# Patient Record
Sex: FEMALE | Race: WHITE | NOT HISPANIC OR LATINO | URBAN - METROPOLITAN AREA
[De-identification: names, ages, dates, MRNs, and addresses within clinical notes are randomized per-mention and may not be internally consistent; named-entity substitution may affect disease eponyms.]

---

## 2023-12-01 ENCOUNTER — OFFICE VISIT (OUTPATIENT)
Dept: OCCUPATIONAL THERAPY | Facility: CLINIC | Age: 63
End: 2023-12-01
Payer: COMMERCIAL

## 2023-12-01 ENCOUNTER — OFFICE VISIT (OUTPATIENT)
Dept: OBGYN CLINIC | Facility: OTHER | Age: 63
End: 2023-12-01
Payer: COMMERCIAL

## 2023-12-01 ENCOUNTER — APPOINTMENT (OUTPATIENT)
Dept: RADIOLOGY | Facility: OTHER | Age: 63
End: 2023-12-01
Payer: COMMERCIAL

## 2023-12-01 VITALS — DIASTOLIC BLOOD PRESSURE: 80 MMHG | SYSTOLIC BLOOD PRESSURE: 118 MMHG | HEIGHT: 66 IN | HEART RATE: 72 BPM

## 2023-12-01 DIAGNOSIS — S63.641D SPRAIN OF ULNAR COLLATERAL LIGAMENT OF METACARPOPHALANGEAL (MCP) JOINT OF RIGHT THUMB, SUBSEQUENT ENCOUNTER: Primary | ICD-10-CM

## 2023-12-01 DIAGNOSIS — M79.644 PAIN OF RIGHT THUMB: ICD-10-CM

## 2023-12-01 DIAGNOSIS — S63.641A SPRAIN OF ULNAR COLLATERAL LIGAMENT OF METACARPOPHALANGEAL (MCP) JOINT OF RIGHT THUMB, INITIAL ENCOUNTER: ICD-10-CM

## 2023-12-01 DIAGNOSIS — S69.91XA INJURY OF RIGHT THUMB, INITIAL ENCOUNTER: ICD-10-CM

## 2023-12-01 DIAGNOSIS — S63.641A SPRAIN OF ULNAR COLLATERAL LIGAMENT OF METACARPOPHALANGEAL (MCP) JOINT OF RIGHT THUMB, INITIAL ENCOUNTER: Primary | ICD-10-CM

## 2023-12-01 PROCEDURE — 99203 OFFICE O/P NEW LOW 30 MIN: CPT | Performed by: ORTHOPAEDIC SURGERY

## 2023-12-01 PROCEDURE — L3913 HFO W/O JOINTS CF: HCPCS

## 2023-12-01 PROCEDURE — 73140 X-RAY EXAM OF FINGER(S): CPT

## 2023-12-01 NOTE — PROGRESS NOTES
[address]  [address] [address]      ASSESSMENT & PLAN:    1. Sprain of ulnar collateral ligament of metacarpophalangeal (MCP) joint of right thumb, initial encounter    2. Injury of right thumb, DOI 12/1/2023      Orders Placed This Encounter   Procedures    Brace    XR thumb right first digit-min 2v    MRI thumb right wo contrast     No orders of the defined types were placed in this encounter. Explained the current clinical findings and reviewed radiological findings with the patient. I suspect that she has sustained right thumb MCP joint UCL sprain. Will recommend custom hand splint for MCP immobilization. Suggest MRI of the right thumb for further evaluation. I informed the patient that I will be out of office over the next few weeks. She is recommended to follow-up with hand orthopedic surgery after completion of her right thumb MRI. In the interim, she is recommended to wear her thumb splint at all times. Patient expresses understanding and is agreeable to my assessment and plan. Patient without further questions or concerns this time. Plan and instruction below was discussed with patient:  There are no Patient Instructions on file for this visit. Follow-Up: No follow-ups on file. IMAGING STUDIES: (I personally reviewed images in PACS and report)    XR right thumb 12/1/2023  No acute osseous abnormality    PAST REPORTS:    PAST PROCEDURES:    Subjective      HPI    Chief Complaint   Patient presents with    Right Thumb - Pain     66 y.o. female presents for initial evaluation of traumatic right thumb pain.       Today patient reports:    Hand dominance: right  Injury related: Yes, DOI 11/21/2023, hyper-extended right thumb when hanging picture frame onto the wall   PMHX: non-contributory    Right thumb pain:  Starting/onset: traumatic  Location: thumb  Pain scale: 0/10 at rest, 4/10 with movement, pain from condition does interfere with activities of daily living  Description: sharp  Radiation: none  Night pain: no  Rest pain: no  Aggravation: gripping  Locking sx: denies locking, catching, or dropping items  Neuro sx: denies neck pain. Denies hand numbness, tingling, or weakness  Treatment: ice and OTC NSAIDS  Previous PT: none  Previous CSI: none  Improvement/worsenin% improved from initial onset      Review of Systems:  Review of Systems         Objective     /80 (BP Location: Left arm, Patient Position: Sitting, Cuff Size: Standard)   Pulse 72   Ht 5' 6" (1.676 m)   There is no height or weight on file to calculate BMI. Patient Active Problem List   Diagnosis    Injury of right thumb, initial encounter        Historical Information     Meds/Allergies   No current outpatient medications on file prior to visit. Allergies   Allergen Reactions    Penicillins Hives and Rash     rash        Historical Information   No past medical history on file. No past surgical history on file.   Family History   Problem Relation Age of Onset    Heart failure Father        Social History   Social Determinants of Health     Tobacco Use: Low Risk  (2023)    Patient History     Smoking Tobacco Use: Never     Smokeless Tobacco Use: Never     Passive Exposure: Not on file   Alcohol Use: Not on file   Financial Resource Strain: Not on file   Food Insecurity: Not on file   Transportation Needs: Not on file   Physical Activity: Not on file   Stress: Not on file   Social Connections: Not on file   Intimate Partner Violence: Not on file   Depression: Not on file   Housing Stability: Not on file   Utilities: Not on file      Social History     Substance and Sexual Activity   Alcohol Use Yes    Comment: Social     Social History     Substance and Sexual Activity   Drug Use Never     Social History     Tobacco Use   Smoking Status Never   Smokeless Tobacco Never       Objective     Physical Exam   Physical Exam  Vitals and nursing note reviewed. Constitutional:       Appearance: She is well-developed. HENT:      Head: Normocephalic and atraumatic. Eyes:      Conjunctiva/sclera: Conjunctivae normal.      Pupils: Pupils are equal, round, and reactive to light. Cardiovascular:      Rate and Rhythm: Normal rate and regular rhythm. Pulmonary:      Effort: Pulmonary effort is normal. No respiratory distress. Skin:     General: Skin is warm and dry. Findings: No erythema. Neurological:      Mental Status: She is alert and oriented to person, place, and time. Cranial Nerves: No cranial nerve deficit. Psychiatric:         Behavior: Behavior normal.         Thought Content: Thought content normal.         Judgment: Judgment normal.       Ortho Exam  Right thumb exam:   Mild swelling of the MCP joint. There are some tenderness to palpation over the ulnar aspect of the MCP joint. Discomfort with laxity on valgus stress testing of the MCP joint in full extension. Mild discomfort without significant laxity with valgus stress testing of the MCP joint at 30 degrees flexion.   No discomfort or pain with varus stress testing of the MCP joint.        --------------------------------------------------    Procedures

## 2023-12-01 NOTE — PROGRESS NOTES
Orthosis    Diagnosis:   1. Sprain of ulnar collateral ligament of metacarpophalangeal (MCP) joint of right thumb, subsequent encounter          Indication:  UCL Sprain    Location: Right  thumb  Supplies: Custom Fit Orthotic  Orthosis type: Hand-Based SPICA  Wearing Schedule: Remove for hygiene only  Describe Position: Hand Based Thumb Spica    Precautions: Universal (skin contact/breakdown)    Patient or Caregiver expresses understanding of wearing Schedule and Precautions? Yes  Patient or Caregiver able to don/doff orthotic independently? Yes    Written orders provided to patient?  Yes  Orders Obtained: Written  Orders Obtained from: Dr. Joe Rodriguze

## 2023-12-04 ENCOUNTER — TELEPHONE (OUTPATIENT)
Age: 63
End: 2023-12-04

## 2023-12-04 ENCOUNTER — OFFICE VISIT (OUTPATIENT)
Dept: OCCUPATIONAL THERAPY | Facility: CLINIC | Age: 63
End: 2023-12-04
Payer: COMMERCIAL

## 2023-12-04 DIAGNOSIS — S69.91XA INJURY OF RIGHT THUMB, INITIAL ENCOUNTER: ICD-10-CM

## 2023-12-04 DIAGNOSIS — S63.641A SPRAIN OF ULNAR COLLATERAL LIGAMENT OF METACARPOPHALANGEAL (MCP) JOINT OF RIGHT THUMB, INITIAL ENCOUNTER: ICD-10-CM

## 2023-12-04 DIAGNOSIS — S63.641D SPRAIN OF ULNAR COLLATERAL LIGAMENT OF METACARPOPHALANGEAL (MCP) JOINT OF RIGHT THUMB, SUBSEQUENT ENCOUNTER: Primary | ICD-10-CM

## 2023-12-04 NOTE — TELEPHONE ENCOUNTER
Called and spoke w/pt and relayed Dr Wood's msg to pt.  She has MRI scheduled for 12/12/23 and appt w/Dr Restrepo on 12/29/23.  She is awaiting to hear if MRI has been approved.  Advised to Call her insurance.

## 2023-12-04 NOTE — TELEPHONE ENCOUNTER
Recommend having MRI and following up with hand orthopedics. Patient likely has UCL tear. Already in a brace.

## 2023-12-04 NOTE — PROGRESS NOTES
Completed splint adjustment for comfort on this date. Splint was rubbing on dorsal hand near [facility]. Therapist adjusted splint. Patient reported increased comfort.

## 2023-12-04 NOTE — TELEPHONE ENCOUNTER
TT sent to Dr Wood to infor R thumb edited results available in [facility]. MRI R casandra scheduled.

## 2023-12-04 NOTE — TELEPHONE ENCOUNTER
Caller: Marika from Radiology  Doctor: [place]    Reason for call: SF xray r thumb 12/1  Results are in EPIC    Call back#: [phone]

## 2023-12-20 ENCOUNTER — HOSPITAL ENCOUNTER (OUTPATIENT)
Dept: RADIOLOGY | Age: 63
Discharge: HOME | End: 2023-12-20
Payer: COMMERCIAL

## 2023-12-20 DIAGNOSIS — S69.91XA INJURY OF RIGHT THUMB, INITIAL ENCOUNTER: ICD-10-CM

## 2023-12-20 PROCEDURE — G1004 CDSM NDSC: HCPCS

## 2023-12-20 PROCEDURE — 73218 MRI UPPER EXTREMITY W/O DYE: CPT

## 2023-12-27 ENCOUNTER — OFFICE VISIT (OUTPATIENT)
Dept: OBGYN CLINIC | Facility: CLINIC | Age: 63
End: 2023-12-27
Payer: OTHER MISCELLANEOUS

## 2023-12-27 DIAGNOSIS — Z01.812 PRE-PROCEDURE LAB EXAM: ICD-10-CM

## 2023-12-27 DIAGNOSIS — S69.91XA INJURY OF RIGHT THUMB, INITIAL ENCOUNTER: ICD-10-CM

## 2023-12-27 DIAGNOSIS — S63.641A RUPTURE OF ULNAR COLLATERAL LIGAMENT OF RIGHT THUMB, INITIAL ENCOUNTER: Primary | ICD-10-CM

## 2023-12-27 PROCEDURE — 99245 OFF/OP CONSLTJ NEW/EST HI 55: CPT | Performed by: SURGERY

## 2023-12-27 RX ORDER — NORETHINDRONE ACETATE AND ETHINYL ESTRADIOL .005; 1 MG/1; MG/1
TABLET, FILM COATED ORAL
COMMUNITY

## 2023-12-27 RX ORDER — DIPHENHYDRAMINE HCL 25 MG
25 CAPSULE ORAL DAILY PRN
COMMUNITY

## 2023-12-27 RX ORDER — CHLORHEXIDINE GLUCONATE ORAL RINSE 1.2 MG/ML
15 SOLUTION DENTAL ONCE
Status: CANCELLED | OUTPATIENT
Start: 2024-01-05 | End: 2023-12-27

## 2023-12-27 RX ORDER — VANCOMYCIN HYDROCHLORIDE 1 G/200ML
1000 INJECTION, SOLUTION INTRAVENOUS ONCE
Status: CANCELLED | OUTPATIENT
Start: 2024-01-05 | End: 2023-12-27

## 2023-12-27 NOTE — H&P
Champ Restrepo M.D.  Attending, Orthopaedic Surgery  Hand, Wrist, and Elbow Surgery  [facility]      ORTHOPAEDIC HAND, WRIST, AND ELBOW OFFICE  VISIT       ASSESSMENT/PLAN:      63-year-old female with right thumb UCL tear, DOI 11/21/23    The MRI was reviewed in the office today which demonstrate a completed UCL tear. On exam, the patient does have gapping compared to the contralateral side. Discussed with the patient that I would recommend surgical intervention in the form of right thumb UCL repair versus reconstruction. Risks and benefits were discussed and detailed below. The patient was agreeable to this. Surgical consent was signed in the office today. Discussed the patient will likely start OT 2 weeks postoperatively for 6-8 weeks. I will see the patient the day of surgery.       The patient verbalized understanding of exam findings and treatment plan. We engaged in the shared decision-making process and treatment options were discussed at length with the patient. Surgical and conservative management discussed today along with risks and benefits.    Diagnoses and all orders for this visit:    Rupture of ulnar collateral ligament of right thumb, initial encounter    Injury of right thumb, DOI 12/1/2023  -     Ambulatory Referral to Hand Surgery    Other orders  -     Fyavolv 1-5 MG-MCG TABS  -     diphenhydrAMINE (BENADRYL) 25 mg capsule; Take 25 mg by mouth daily as needed        Follow Up:  Return for After surgery.    To Do Next Visit:  Sutures out    Operative Discussions:  Thumb MP Ulnar collateral ligament tear: Skiers thumb is an acute rupture of the ulnar collateral ligament of the thumb MP joint creating an unstable thumb. These injuries can heal with immobilization (cast or splint) of the thumb if the ligament remains in close approximation to its ruptured site. However, surgery is still an option and allows for early mobilization. If the ligament is retracted away from its  ruptured site, referred to as a Stenner's lesion, the ligament will not heal with immobilization alone and surgery is required. Surgical treatment consists of repairing the ligament to the bone from which it ruptured (typically the proximal phalanx) with suture and a bone anchor or through bone tunnels.The patient opted for surgical treatment. The risks and benefits of the thumb MP ulnar collateral ligament repair were explained to the patient, which include, but are not limited to: Bleeding, infection, recurrence, pain, scar, damage to tendons, damage to nerves, and damage to blood vessels, failure to give desired results and complications related to anesthesia. These risks, along with alternative conservative treatment options, and postoperative protocols were voiced back and understood by the patient. All questions were answered to the patient's satisfaction. The patient agrees to comply with a standard postoperative protocol, and is willing to proceed. Education was provided via written and auditory forms. There were no barriers to learning. Written handouts regarding wound care, incision and scar care, and general preoperative information was provided to the patient. Prior to surgery, the patient may be requested to stop all anti-inflammatory medications. Prophylactic aspirin, Plavix, and Coumadin may be allowed to be continued. Medications including vitamin E., ginkgo, and fish oil are requested to be stopped approximately one week prior to surgery. Hypertensive medications and beta blockers, if taken, should be continued.      ____________________________________________________________________________________________________________________________________________      CHIEF COMPLAINT:  Chief Complaint   Patient presents with    Right Thumb - Pain     Since wearing brace, has improved       SUBJECTIVE:  Manju Elizondo is a 63 y.o. year old RHD female who presents to the office today as a referral from   [place] for evaluation of right thumb pain. The patient states on 11/21/23 she was trying to take down a piece of art while at work and hyperextended her thumb. The patient states she noted immediate pain. She was evaluated by Dr. Wood on 12/1/23 and he ordered an MRI and was referred to OT for a custom brace. The patient notes pain to the ulnar and radial aspect of her thumb MCP joint. She notes increased pain with . She has been using the custom splint which does provide her with some relief. She describes the pain as intermittent and better at rest.    Pain/symptom timing:  Worse during the day when active  Pain/symptom context:  Worse with activites and work  Pain/symptom modifying factors:  Rest makes better, activities make worse  Pain/symptom associated signs/symptoms: none    Prior treatment   NSAIDsNo   Injections No   Bracing/Orthotics Yes    Physical Therapy No     I have personally reviewed all the relevant PMH, PSH, SH, FH, Medications and allergies      PAST MEDICAL HISTORY:  History reviewed. No pertinent past medical history.    PAST SURGICAL HISTORY:  History reviewed. No pertinent surgical history.    FAMILY HISTORY:  Family History   Problem Relation Age of Onset    Heart failure Father        SOCIAL HISTORY:  Social History     Tobacco Use    Smoking status: Never    Smokeless tobacco: Never   Vaping Use    Vaping status: Never Used   Substance Use Topics    Alcohol use: Yes     Comment: Social    Drug use: Never       MEDICATIONS:    Current Outpatient Medications:     diphenhydrAMINE (BENADRYL) 25 mg capsule, Take 25 mg by mouth daily as needed, Disp: , Rfl:     Fyavolv 1-5 MG-MCG TABS, , Disp: , Rfl:     ALLERGIES:  Allergies   Allergen Reactions    Penicillins Hives and Rash     rash           REVIEW OF SYSTEMS:  Review of Systems   Constitutional:  Negative for chills and fever.   HENT:  Negative for drooling and sneezing.    Eyes:  Negative for redness.   Respiratory:  Negative for  "cough and wheezing.    Gastrointestinal:  Negative for nausea and vomiting.   Musculoskeletal:  Positive for arthralgias. Negative for joint swelling and myalgias.   Neurological:  Negative for weakness and numbness.   Psychiatric/Behavioral:  Negative for behavioral problems. The patient is not nervous/anxious.        VITALS:  There were no vitals filed for this visit.    LABS:  HgA1c: No results found for: \"HGBA1C\"  BMP: No results found for: \"GLUCOSE\", \"CALCIUM\", \"NA\", \"K\", \"CO2\", \"CL\", \"BUN\", \"CREATININE\"    _____________________________________________________  PHYSICAL EXAMINATION:  General: well developed and well nourished, alert, oriented times 3, and appears comfortable  Psychiatric: Normal  HEENT: Normocephalic, Atraumatic Trachea Midline, No torticollis  Pulmonary: No audible wheezing or respiratory distress   Abdomen/GI: Non tender, non distended   Cardiovascular: No pitting edema, 2+ radial pulse   Skin: No masses, erythema, lacerations, fluctation, ulcerations  Neurovascular: Sensation Intact to the Median, Ulnar, Radial Nerve, Motor Intact to the Median, Ulnar, Radial Nerve, and Pulses Intact  Musculoskeletal: Normal, except as noted in detailed exam and in HPI.      MUSCULOSKELETAL EXAMINATION:  Right thumb  TTP UCL  Pain with UCL testing  20 degree UCL instability in 30 of  flexion and  full extension   ___________________________________________________  STUDIES REVIEWED:  I have personally reviewed sagittal, coronal and axial series of Right thumb which demonstrate complete UCL tear.            PROCEDURES PERFORMED:  Procedures  No Procedures performed today    _____________________________________________________      Scribe Attestation      I,:  Atiya Ditmars, MA am acting as a scribe while in the presence of the attending physician.:       I,:  Champ Restrepo MD personally performed the services described in this documentation    as scribed in my presence.:                       "

## 2023-12-27 NOTE — PROGRESS NOTES
Champ Restrepo M.D.  Attending, Orthopaedic Surgery  Hand, Wrist, and Elbow Surgery  [facility]      ORTHOPAEDIC HAND, WRIST, AND ELBOW OFFICE  VISIT       ASSESSMENT/PLAN:      63-year-old female with right thumb UCL tear, DOI 11/21/23    The MRI was reviewed in the office today which demonstrate a completed UCL tear. On exam, the patient does have gapping compared to the contralateral side. Discussed with the patient that I would recommend surgical intervention in the form of right thumb UCL repair versus reconstruction. Risks and benefits were discussed and detailed below. The patient was agreeable to this. Surgical consent was signed in the office today. Discussed the patient will likely start OT 2 weeks postoperatively for 6-8 weeks. I will see the patient the day of surgery.       The patient verbalized understanding of exam findings and treatment plan. We engaged in the shared decision-making process and treatment options were discussed at length with the patient. Surgical and conservative management discussed today along with risks and benefits.    Diagnoses and all orders for this visit:    Rupture of ulnar collateral ligament of right thumb, initial encounter    Injury of right thumb, DOI 12/1/2023  -     Ambulatory Referral to Hand Surgery    Other orders  -     Fyavolv 1-5 MG-MCG TABS  -     diphenhydrAMINE (BENADRYL) 25 mg capsule; Take 25 mg by mouth daily as needed        Follow Up:  Return for After surgery.    To Do Next Visit:  Sutures out    Operative Discussions:  Thumb MP Ulnar collateral ligament tear: Skiers thumb is an acute rupture of the ulnar collateral ligament of the thumb MP joint creating an unstable thumb. These injuries can heal with immobilization (cast or splint) of the thumb if the ligament remains in close approximation to its ruptured site. However, surgery is still an option and allows for early mobilization. If the ligament is retracted away from its  ruptured site, referred to as a Stenner's lesion, the ligament will not heal with immobilization alone and surgery is required. Surgical treatment consists of repairing the ligament to the bone from which it ruptured (typically the proximal phalanx) with suture and a bone anchor or through bone tunnels.The patient opted for surgical treatment. The risks and benefits of the thumb MP ulnar collateral ligament repair were explained to the patient, which include, but are not limited to: Bleeding, infection, recurrence, pain, scar, damage to tendons, damage to nerves, and damage to blood vessels, failure to give desired results and complications related to anesthesia. These risks, along with alternative conservative treatment options, and postoperative protocols were voiced back and understood by the patient. All questions were answered to the patient's satisfaction. The patient agrees to comply with a standard postoperative protocol, and is willing to proceed. Education was provided via written and auditory forms. There were no barriers to learning. Written handouts regarding wound care, incision and scar care, and general preoperative information was provided to the patient. Prior to surgery, the patient may be requested to stop all anti-inflammatory medications. Prophylactic aspirin, Plavix, and Coumadin may be allowed to be continued. Medications including vitamin E., ginkgo, and fish oil are requested to be stopped approximately one week prior to surgery. Hypertensive medications and beta blockers, if taken, should be continued.      ____________________________________________________________________________________________________________________________________________      CHIEF COMPLAINT:  Chief Complaint   Patient presents with    Right Thumb - Pain     Since wearing brace, has improved       SUBJECTIVE:  Manju Elizondo is a 63 y.o. year old RHD female who presents to the office today as a referral from   [place] for evaluation of right thumb pain. The patient states on 11/21/23 she was trying to take down a piece of art while at work and hyperextended her thumb. The patient states she noted immediate pain. She was evaluated by Dr. Wood on 12/1/23 and he ordered an MRI and was referred to OT for a custom brace. The patient notes pain to the ulnar and radial aspect of her thumb MCP joint. She notes increased pain with . She has been using the custom splint which does provide her with some relief. She describes the pain as intermittent and better at rest.    Pain/symptom timing:  Worse during the day when active  Pain/symptom context:  Worse with activites and work  Pain/symptom modifying factors:  Rest makes better, activities make worse  Pain/symptom associated signs/symptoms: none    Prior treatment   NSAIDsNo   Injections No   Bracing/Orthotics Yes    Physical Therapy No     I have personally reviewed all the relevant PMH, PSH, SH, FH, Medications and allergies      PAST MEDICAL HISTORY:  History reviewed. No pertinent past medical history.    PAST SURGICAL HISTORY:  History reviewed. No pertinent surgical history.    FAMILY HISTORY:  Family History   Problem Relation Age of Onset    Heart failure Father        SOCIAL HISTORY:  Social History     Tobacco Use    Smoking status: Never    Smokeless tobacco: Never   Vaping Use    Vaping status: Never Used   Substance Use Topics    Alcohol use: Yes     Comment: Social    Drug use: Never       MEDICATIONS:    Current Outpatient Medications:     diphenhydrAMINE (BENADRYL) 25 mg capsule, Take 25 mg by mouth daily as needed, Disp: , Rfl:     Fyavolv 1-5 MG-MCG TABS, , Disp: , Rfl:     ALLERGIES:  Allergies   Allergen Reactions    Penicillins Hives and Rash     rash           REVIEW OF SYSTEMS:  Review of Systems   Constitutional:  Negative for chills and fever.   HENT:  Negative for drooling and sneezing.    Eyes:  Negative for redness.   Respiratory:  Negative for  "cough and wheezing.    Gastrointestinal:  Negative for nausea and vomiting.   Musculoskeletal:  Positive for arthralgias. Negative for joint swelling and myalgias.   Neurological:  Negative for weakness and numbness.   Psychiatric/Behavioral:  Negative for behavioral problems. The patient is not nervous/anxious.        VITALS:  There were no vitals filed for this visit.    LABS:  HgA1c: No results found for: \"HGBA1C\"  BMP: No results found for: \"GLUCOSE\", \"CALCIUM\", \"NA\", \"K\", \"CO2\", \"CL\", \"BUN\", \"CREATININE\"    _____________________________________________________  PHYSICAL EXAMINATION:  General: well developed and well nourished, alert, oriented times 3, and appears comfortable  Psychiatric: Normal  HEENT: Normocephalic, Atraumatic Trachea Midline, No torticollis  Pulmonary: No audible wheezing or respiratory distress   Abdomen/GI: Non tender, non distended   Cardiovascular: No pitting edema, 2+ radial pulse   Skin: No masses, erythema, lacerations, fluctation, ulcerations  Neurovascular: Sensation Intact to the Median, Ulnar, Radial Nerve, Motor Intact to the Median, Ulnar, Radial Nerve, and Pulses Intact  Musculoskeletal: Normal, except as noted in detailed exam and in HPI.      MUSCULOSKELETAL EXAMINATION:  Right thumb  TTP UCL  Pain with UCL testing  20 degree UCL instability in 30 of  flexion and  full extension   ___________________________________________________  STUDIES REVIEWED:  I have personally reviewed sagittal, coronal and axial series of Right thumb which demonstrate complete UCL tear.            PROCEDURES PERFORMED:  Procedures  No Procedures performed today    _____________________________________________________      Scribe Attestation      I,:  Atiya Ditmars, MA am acting as a scribe while in the presence of the attending physician.:       I,:  Champ Restrepo MD personally performed the services described in this documentation    as scribed in my presence.:                     "

## 2023-12-27 NOTE — H&P (VIEW-ONLY)
Champ Restrepo M.D.  Attending, Orthopaedic Surgery  Hand, Wrist, and Elbow Surgery  [facility]      ORTHOPAEDIC HAND, WRIST, AND ELBOW OFFICE  VISIT       ASSESSMENT/PLAN:      63-year-old female with right thumb UCL tear, DOI 11/21/23    The MRI was reviewed in the office today which demonstrate a completed UCL tear. On exam, the patient does have gapping compared to the contralateral side. Discussed with the patient that I would recommend surgical intervention in the form of right thumb UCL repair versus reconstruction. Risks and benefits were discussed and detailed below. The patient was agreeable to this. Surgical consent was signed in the office today. Discussed the patient will likely start OT 2 weeks postoperatively for 6-8 weeks. I will see the patient the day of surgery.       The patient verbalized understanding of exam findings and treatment plan. We engaged in the shared decision-making process and treatment options were discussed at length with the patient. Surgical and conservative management discussed today along with risks and benefits.    Diagnoses and all orders for this visit:    Rupture of ulnar collateral ligament of right thumb, initial encounter    Injury of right thumb, DOI 12/1/2023  -     Ambulatory Referral to Hand Surgery    Other orders  -     Fyavolv 1-5 MG-MCG TABS  -     diphenhydrAMINE (BENADRYL) 25 mg capsule; Take 25 mg by mouth daily as needed        Follow Up:  Return for After surgery.    To Do Next Visit:  Sutures out    Operative Discussions:  Thumb MP Ulnar collateral ligament tear: Skiers thumb is an acute rupture of the ulnar collateral ligament of the thumb MP joint creating an unstable thumb. These injuries can heal with immobilization (cast or splint) of the thumb if the ligament remains in close approximation to its ruptured site. However, surgery is still an option and allows for early mobilization. If the ligament is retracted away from its  ruptured site, referred to as a Stenner's lesion, the ligament will not heal with immobilization alone and surgery is required. Surgical treatment consists of repairing the ligament to the bone from which it ruptured (typically the proximal phalanx) with suture and a bone anchor or through bone tunnels.The patient opted for surgical treatment. The risks and benefits of the thumb MP ulnar collateral ligament repair were explained to the patient, which include, but are not limited to: Bleeding, infection, recurrence, pain, scar, damage to tendons, damage to nerves, and damage to blood vessels, failure to give desired results and complications related to anesthesia. These risks, along with alternative conservative treatment options, and postoperative protocols were voiced back and understood by the patient. All questions were answered to the patient's satisfaction. The patient agrees to comply with a standard postoperative protocol, and is willing to proceed. Education was provided via written and auditory forms. There were no barriers to learning. Written handouts regarding wound care, incision and scar care, and general preoperative information was provided to the patient. Prior to surgery, the patient may be requested to stop all anti-inflammatory medications. Prophylactic aspirin, Plavix, and Coumadin may be allowed to be continued. Medications including vitamin E., ginkgo, and fish oil are requested to be stopped approximately one week prior to surgery. Hypertensive medications and beta blockers, if taken, should be continued.      ____________________________________________________________________________________________________________________________________________      CHIEF COMPLAINT:  Chief Complaint   Patient presents with    Right Thumb - Pain     Since wearing brace, has improved       SUBJECTIVE:  Manju Elizondo is a 63 y.o. year old RHD female who presents to the office today as a referral from   [place] for evaluation of right thumb pain. The patient states on 11/21/23 she was trying to take down a piece of art while at work and hyperextended her thumb. The patient states she noted immediate pain. She was evaluated by Dr. Wood on 12/1/23 and he ordered an MRI and was referred to OT for a custom brace. The patient notes pain to the ulnar and radial aspect of her thumb MCP joint. She notes increased pain with . She has been using the custom splint which does provide her with some relief. She describes the pain as intermittent and better at rest.    Pain/symptom timing:  Worse during the day when active  Pain/symptom context:  Worse with activites and work  Pain/symptom modifying factors:  Rest makes better, activities make worse  Pain/symptom associated signs/symptoms: none    Prior treatment   NSAIDsNo   Injections No   Bracing/Orthotics Yes    Physical Therapy No     I have personally reviewed all the relevant PMH, PSH, SH, FH, Medications and allergies      PAST MEDICAL HISTORY:  History reviewed. No pertinent past medical history.    PAST SURGICAL HISTORY:  History reviewed. No pertinent surgical history.    FAMILY HISTORY:  Family History   Problem Relation Age of Onset    Heart failure Father        SOCIAL HISTORY:  Social History     Tobacco Use    Smoking status: Never    Smokeless tobacco: Never   Vaping Use    Vaping status: Never Used   Substance Use Topics    Alcohol use: Yes     Comment: Social    Drug use: Never       MEDICATIONS:    Current Outpatient Medications:     diphenhydrAMINE (BENADRYL) 25 mg capsule, Take 25 mg by mouth daily as needed, Disp: , Rfl:     Fyavolv 1-5 MG-MCG TABS, , Disp: , Rfl:     ALLERGIES:  Allergies   Allergen Reactions    Penicillins Hives and Rash     rash           REVIEW OF SYSTEMS:  Review of Systems   Constitutional:  Negative for chills and fever.   HENT:  Negative for drooling and sneezing.    Eyes:  Negative for redness.   Respiratory:  Negative for  "cough and wheezing.    Gastrointestinal:  Negative for nausea and vomiting.   Musculoskeletal:  Positive for arthralgias. Negative for joint swelling and myalgias.   Neurological:  Negative for weakness and numbness.   Psychiatric/Behavioral:  Negative for behavioral problems. The patient is not nervous/anxious.        VITALS:  There were no vitals filed for this visit.    LABS:  HgA1c: No results found for: \"HGBA1C\"  BMP: No results found for: \"GLUCOSE\", \"CALCIUM\", \"NA\", \"K\", \"CO2\", \"CL\", \"BUN\", \"CREATININE\"    _____________________________________________________  PHYSICAL EXAMINATION:  General: well developed and well nourished, alert, oriented times 3, and appears comfortable  Psychiatric: Normal  HEENT: Normocephalic, Atraumatic Trachea Midline, No torticollis  Pulmonary: No audible wheezing or respiratory distress   Abdomen/GI: Non tender, non distended   Cardiovascular: No pitting edema, 2+ radial pulse   Skin: No masses, erythema, lacerations, fluctation, ulcerations  Neurovascular: Sensation Intact to the Median, Ulnar, Radial Nerve, Motor Intact to the Median, Ulnar, Radial Nerve, and Pulses Intact  Musculoskeletal: Normal, except as noted in detailed exam and in HPI.      MUSCULOSKELETAL EXAMINATION:  Right thumb  TTP UCL  Pain with UCL testing  20 degree UCL instability in 30 of  flexion and  full extension   ___________________________________________________  STUDIES REVIEWED:  I have personally reviewed sagittal, coronal and axial series of Right thumb which demonstrate complete UCL tear.            PROCEDURES PERFORMED:  Procedures  No Procedures performed today    _____________________________________________________      Scribe Attestation      I,:  Atiya Ditmars, MA am acting as a scribe while in the presence of the attending physician.:       I,:  Champ Restrepo MD personally performed the services described in this documentation    as scribed in my presence.:                       "

## 2023-12-28 ENCOUNTER — APPOINTMENT (OUTPATIENT)
Dept: LAB | Age: 63
End: 2023-12-28
Payer: OTHER MISCELLANEOUS

## 2023-12-28 DIAGNOSIS — S63.641A RUPTURE OF ULNAR COLLATERAL LIGAMENT OF RIGHT THUMB, INITIAL ENCOUNTER: ICD-10-CM

## 2023-12-28 DIAGNOSIS — Z01.812 PRE-PROCEDURE LAB EXAM: ICD-10-CM

## 2023-12-28 DIAGNOSIS — S69.91XA INJURY OF RIGHT THUMB, INITIAL ENCOUNTER: ICD-10-CM

## 2023-12-28 LAB
ANION GAP SERPL CALCULATED.3IONS-SCNC: 8 MMOL/L
BUN SERPL-MCNC: 15 MG/DL (ref 5–25)
CALCIUM SERPL-MCNC: 9.5 MG/DL (ref 8.4–10.2)
CHLORIDE SERPL-SCNC: 102 MMOL/L (ref 96–108)
CO2 SERPL-SCNC: 29 MMOL/L (ref 21–32)
CREAT SERPL-MCNC: 0.78 MG/DL (ref 0.6–1.3)
GFR SERPL CREATININE-BSD FRML MDRD: 82 ML/MIN/1.73SQ M
GLUCOSE SERPL-MCNC: 93 MG/DL (ref 65–140)
POTASSIUM SERPL-SCNC: 4.1 MMOL/L (ref 3.5–5.3)
SODIUM SERPL-SCNC: 139 MMOL/L (ref 135–147)

## 2023-12-28 PROCEDURE — 93005 ELECTROCARDIOGRAM TRACING: CPT

## 2023-12-28 PROCEDURE — 80048 BASIC METABOLIC PNL TOTAL CA: CPT

## 2023-12-28 PROCEDURE — 36415 COLL VENOUS BLD VENIPUNCTURE: CPT

## 2023-12-28 NOTE — PRE-PROCEDURE INSTRUCTIONS
Pre-Surgery Instructions:   Medication Instructions    diphenhydrAMINE (BENADRYL) 25 mg capsule Uses PRN- OK to take day of surgery    Fyavolv 1-5 MG-MCG TABS Take night before surgery    Medication instructions for day surgery reviewed. Please use only a sip of water to take your instructed medications. Avoid all over the counter vitamins, supplements and NSAIDS for one week prior to surgery per anesthesia guidelines. Tylenol is ok to take as needed.     You will receive a call one business day prior to surgery with an arrival time and hospital directions. If your surgery is scheduled on a Monday, the hospital will be calling you on the Friday prior to your surgery. If you have not heard from anyone by 8pm, please call the hospital supervisor through the hospital  at [phone]. (Silvio [phone]).    Do not eat or drink anything after midnight the night before your surgery, including candy, mints, lifesavers, or chewing gum. Do not drink alcohol 24hrs before your surgery. Try not to smoke at least 24hrs before your surgery.       Follow the pre surgery showering instructions as listed in the “My Surgical Experience Booklet” or otherwise provided by your surgeon's office. Do not use a blade to shave the surgical area 1 week before surgery. It is okay to use a clean electric clippers up to 24 hours before surgery. Do not apply any lotions, creams, including makeup, cologne, deodorant, or perfumes after showering on the day of your surgery. Do not use dry shampoo, hair spray, hair gel, or any type of hair products.     No contact lenses, eye make-up, or artificial eyelashes. Remove nail polish, including gel polish, and any artificial, gel, or acrylic nails if possible. Remove all jewelry including rings and body piercing jewelry.     Wear causal clothing that is easy to take on and off. Consider your type of surgery.    Keep any valuables, jewelry, piercings at home. Please bring any specially ordered  equipment (sling, braces) if indicated.    Arrange for a responsible person to drive you to and from the hospital on the day of your surgery. Visitor Guidelines discussed.     Call the surgeon's office with any new illnesses, exposures, or additional questions prior to surgery.    Please reference your “My Surgical Experience Booklet” for additional information to prepare for your upcoming surgery.

## 2023-12-30 LAB
ATRIAL RATE: 66 BPM
P AXIS: 63 DEGREES
PR INTERVAL: 156 MS
QRS AXIS: 69 DEGREES
QRSD INTERVAL: 68 MS
QT INTERVAL: 386 MS
QTC INTERVAL: 404 MS
T WAVE AXIS: 70 DEGREES
VENTRICULAR RATE: 66 BPM

## 2023-12-30 PROCEDURE — 93010 ELECTROCARDIOGRAM REPORT: CPT | Performed by: INTERNAL MEDICINE

## 2024-01-02 ENCOUNTER — ANESTHESIA EVENT (OUTPATIENT)
Age: 64
End: 2024-01-02
Payer: OTHER MISCELLANEOUS

## 2024-01-04 NOTE — ANESTHESIA PREPROCEDURE EVALUATION
Procedure:  RECONSTRUCTION UCL THUMB (Right: Finger)    Relevant Problems   ANESTHESIA (within normal limits)      Other   (+) Injury of right thumb, initial encounter        Physical Exam    Airway    Mallampati score: I  TM Distance: >3 FB  Neck ROM: full     Dental   No notable dental hx     Cardiovascular  Rhythm: regular, Rate: normal, Cardiovascular exam normal    Pulmonary  Pulmonary exam normal Breath sounds clear to auscultation    Other Findings  post-pubertal.      Anesthesia Plan  ASA Score- 1     Anesthesia Type- regional with ASA Monitors.         Additional Monitors:     Airway Plan:            Plan Factors-Exercise tolerance (METS): >4 METS.    Chart reviewed.    Patient summary reviewed.    Patient is not a current smoker.              Induction-     Postoperative Plan-     Informed Consent- Anesthetic plan and risks discussed with patient.  I personally reviewed this patient with the CRNA. Discussed and agreed on the Anesthesia Plan with the CRNA..

## 2024-01-05 ENCOUNTER — HOSPITAL ENCOUNTER (OUTPATIENT)
Age: 64
Discharge: HOME | End: 2024-01-05

## 2024-01-05 ENCOUNTER — HOSPITAL ENCOUNTER (OUTPATIENT)
Age: 64
Setting detail: OUTPATIENT SURGERY
Discharge: HOME | End: 2024-01-05
Attending: SURGERY | Admitting: SURGERY
Payer: OTHER MISCELLANEOUS

## 2024-01-05 ENCOUNTER — ANESTHESIA (OUTPATIENT)
Age: 64
End: 2024-01-05
Payer: OTHER MISCELLANEOUS

## 2024-01-05 VITALS
OXYGEN SATURATION: 98 % | TEMPERATURE: 97.6 F | BODY MASS INDEX: 20.57 KG/M2 | SYSTOLIC BLOOD PRESSURE: 117 MMHG | RESPIRATION RATE: 22 BRPM | HEIGHT: 66 IN | HEART RATE: 67 BPM | WEIGHT: 128 LBS | DIASTOLIC BLOOD PRESSURE: 56 MMHG

## 2024-01-05 DIAGNOSIS — Z47.89 AFTERCARE FOLLOWING SURGERY OF THE MUSCULOSKELETAL SYSTEM: ICD-10-CM

## 2024-01-05 DIAGNOSIS — S69.91XA INJURY OF RIGHT THUMB, INITIAL ENCOUNTER: ICD-10-CM

## 2024-01-05 DIAGNOSIS — S63.641D RUPTURE OF ULNAR COLLATERAL LIGAMENT OF RIGHT THUMB, SUBSEQUENT ENCOUNTER: Primary | ICD-10-CM

## 2024-01-05 PROBLEM — S63.641A RUPTURE OF ULNAR COLLATERAL LIGAMENT OF RIGHT THUMB: Status: ACTIVE | Noted: 2023-12-01

## 2024-01-05 PROCEDURE — 26540 REPAIR HAND JOINT: CPT | Performed by: STUDENT IN AN ORGANIZED HEALTH CARE EDUCATION/TRAINING PROGRAM

## 2024-01-05 PROCEDURE — C1713 ANCHOR/SCREW BN/BN,TIS/BN: HCPCS | Performed by: SURGERY

## 2024-01-05 DEVICE — SUTURE ANCHOR, MICRO CORKSCREW FT
Type: IMPLANTABLE DEVICE | Site: FINGER | Status: FUNCTIONAL
Brand: ARTHREX®

## 2024-01-05 RX ORDER — IPRATROPIUM BROMIDE AND ALBUTEROL SULFATE 2.5; .5 MG/3ML; MG/3ML
3 SOLUTION RESPIRATORY (INHALATION) ONCE AS NEEDED
Status: DISCONTINUED | OUTPATIENT
Start: 2024-01-05 | End: 2024-01-05 | Stop reason: HOSPADM

## 2024-01-05 RX ORDER — LABETALOL HYDROCHLORIDE 5 MG/ML
10 INJECTION, SOLUTION INTRAVENOUS
Status: DISCONTINUED | OUTPATIENT
Start: 2024-01-05 | End: 2024-01-05 | Stop reason: HOSPADM

## 2024-01-05 RX ORDER — ONDANSETRON 2 MG/ML
4 INJECTION INTRAMUSCULAR; INTRAVENOUS ONCE AS NEEDED
Status: DISCONTINUED | OUTPATIENT
Start: 2024-01-05 | End: 2024-01-05 | Stop reason: HOSPADM

## 2024-01-05 RX ORDER — OXYCODONE HYDROCHLORIDE AND ACETAMINOPHEN 5; 325 MG/1; MG/1
1 TABLET ORAL EVERY 4 HOURS PRN
Status: DISCONTINUED | OUTPATIENT
Start: 2024-01-05 | End: 2024-01-05 | Stop reason: HOSPADM

## 2024-01-05 RX ORDER — MIDAZOLAM HYDROCHLORIDE 2 MG/2ML
INJECTION, SOLUTION INTRAMUSCULAR; INTRAVENOUS AS NEEDED
Status: DISCONTINUED | OUTPATIENT
Start: 2024-01-05 | End: 2024-01-05

## 2024-01-05 RX ORDER — HYDROMORPHONE HCL IN WATER/PF 6 MG/30 ML
0.2 PATIENT CONTROLLED ANALGESIA SYRINGE INTRAVENOUS
Status: DISCONTINUED | OUTPATIENT
Start: 2024-01-05 | End: 2024-01-05 | Stop reason: HOSPADM

## 2024-01-05 RX ORDER — METOCLOPRAMIDE HYDROCHLORIDE 5 MG/ML
10 INJECTION INTRAMUSCULAR; INTRAVENOUS ONCE AS NEEDED
Status: DISCONTINUED | OUTPATIENT
Start: 2024-01-05 | End: 2024-01-05 | Stop reason: HOSPADM

## 2024-01-05 RX ORDER — KETOROLAC TROMETHAMINE 30 MG/ML
INJECTION, SOLUTION INTRAMUSCULAR; INTRAVENOUS AS NEEDED
Status: DISCONTINUED | OUTPATIENT
Start: 2024-01-05 | End: 2024-01-05

## 2024-01-05 RX ORDER — HYDRALAZINE HYDROCHLORIDE 20 MG/ML
5 INJECTION INTRAMUSCULAR; INTRAVENOUS
Status: DISCONTINUED | OUTPATIENT
Start: 2024-01-05 | End: 2024-01-05 | Stop reason: HOSPADM

## 2024-01-05 RX ORDER — BUPIVACAINE HYDROCHLORIDE 2.5 MG/ML
INJECTION, SOLUTION EPIDURAL; INFILTRATION; INTRACAUDAL
Status: COMPLETED | OUTPATIENT
Start: 2024-01-05 | End: 2024-01-05

## 2024-01-05 RX ORDER — PROMETHAZINE HYDROCHLORIDE 25 MG/ML
12.5 INJECTION, SOLUTION INTRAMUSCULAR; INTRAVENOUS ONCE AS NEEDED
Status: DISCONTINUED | OUTPATIENT
Start: 2024-01-05 | End: 2024-01-05 | Stop reason: HOSPADM

## 2024-01-05 RX ORDER — OXYCODONE HYDROCHLORIDE AND ACETAMINOPHEN 5; 325 MG/1; MG/1
1 TABLET ORAL EVERY 4 HOURS PRN
Qty: 10 TABLET | Refills: 0 | Status: SHIPPED | OUTPATIENT
Start: 2024-01-05 | End: 2024-01-15

## 2024-01-05 RX ORDER — CHLORHEXIDINE GLUCONATE ORAL RINSE 1.2 MG/ML
15 SOLUTION DENTAL ONCE
Status: COMPLETED | OUTPATIENT
Start: 2024-01-05 | End: 2024-01-05

## 2024-01-05 RX ORDER — EPHEDRINE SULFATE 50 MG/ML
INJECTION INTRAVENOUS AS NEEDED
Status: DISCONTINUED | OUTPATIENT
Start: 2024-01-05 | End: 2024-01-05

## 2024-01-05 RX ORDER — LIDOCAINE HYDROCHLORIDE 10 MG/ML
INJECTION, SOLUTION EPIDURAL; INFILTRATION; INTRACAUDAL; PERINEURAL AS NEEDED
Status: DISCONTINUED | OUTPATIENT
Start: 2024-01-05 | End: 2024-01-05

## 2024-01-05 RX ORDER — PROPOFOL 10 MG/ML
INJECTION, EMULSION INTRAVENOUS AS NEEDED
Status: DISCONTINUED | OUTPATIENT
Start: 2024-01-05 | End: 2024-01-05

## 2024-01-05 RX ORDER — VANCOMYCIN HYDROCHLORIDE 1 G/200ML
1000 INJECTION, SOLUTION INTRAVENOUS ONCE
Status: DISCONTINUED | OUTPATIENT
Start: 2024-01-05 | End: 2024-01-05 | Stop reason: HOSPADM

## 2024-01-05 RX ORDER — CEFAZOLIN SODIUM 1 G/3ML
INJECTION, POWDER, FOR SOLUTION INTRAMUSCULAR; INTRAVENOUS AS NEEDED
Status: DISCONTINUED | OUTPATIENT
Start: 2024-01-05 | End: 2024-01-05

## 2024-01-05 RX ORDER — ONDANSETRON 2 MG/ML
INJECTION INTRAMUSCULAR; INTRAVENOUS AS NEEDED
Status: DISCONTINUED | OUTPATIENT
Start: 2024-01-05 | End: 2024-01-05

## 2024-01-05 RX ORDER — SODIUM CHLORIDE, SODIUM LACTATE, POTASSIUM CHLORIDE, CALCIUM CHLORIDE 600; 310; 30; 20 MG/100ML; MG/100ML; MG/100ML; MG/100ML
125 INJECTION, SOLUTION INTRAVENOUS CONTINUOUS
Status: DISCONTINUED | OUTPATIENT
Start: 2024-01-05 | End: 2024-01-05 | Stop reason: HOSPADM

## 2024-01-05 RX ORDER — SODIUM CHLORIDE, SODIUM LACTATE, POTASSIUM CHLORIDE, CALCIUM CHLORIDE 600; 310; 30; 20 MG/100ML; MG/100ML; MG/100ML; MG/100ML
75 INJECTION, SOLUTION INTRAVENOUS CONTINUOUS
Status: DISCONTINUED | OUTPATIENT
Start: 2024-01-05 | End: 2024-01-05 | Stop reason: HOSPADM

## 2024-01-05 RX ORDER — ALBUTEROL SULFATE 2.5 MG/3ML
2.5 SOLUTION RESPIRATORY (INHALATION) ONCE AS NEEDED
Status: DISCONTINUED | OUTPATIENT
Start: 2024-01-05 | End: 2024-01-05 | Stop reason: SDUPTHER

## 2024-01-05 RX ORDER — HYDROMORPHONE HCL/PF 1 MG/ML
0.5 SYRINGE (ML) INJECTION
Status: DISCONTINUED | OUTPATIENT
Start: 2024-01-05 | End: 2024-01-05 | Stop reason: HOSPADM

## 2024-01-05 RX ORDER — MAGNESIUM HYDROXIDE 1200 MG/15ML
LIQUID ORAL AS NEEDED
Status: DISCONTINUED | OUTPATIENT
Start: 2024-01-05 | End: 2024-01-05 | Stop reason: HOSPADM

## 2024-01-05 RX ORDER — FENTANYL CITRATE 50 UG/ML
25 INJECTION, SOLUTION INTRAMUSCULAR; INTRAVENOUS
Status: DISCONTINUED | OUTPATIENT
Start: 2024-01-05 | End: 2024-01-05 | Stop reason: HOSPADM

## 2024-01-05 RX ADMIN — SODIUM CHLORIDE, SODIUM LACTATE, POTASSIUM CHLORIDE, AND CALCIUM CHLORIDE 75 ML/HR: .6; .31; .03; .02 INJECTION, SOLUTION INTRAVENOUS at 10:31

## 2024-01-05 RX ADMIN — ONDANSETRON 4 MG: 2 INJECTION INTRAMUSCULAR; INTRAVENOUS at 11:19

## 2024-01-05 RX ADMIN — PROPOFOL 150 MG: 10 INJECTION, EMULSION INTRAVENOUS at 11:09

## 2024-01-05 RX ADMIN — LIDOCAINE HYDROCHLORIDE 50 MG: 10 INJECTION, SOLUTION EPIDURAL; INFILTRATION; INTRACAUDAL; PERINEURAL at 11:09

## 2024-01-05 RX ADMIN — BUPIVACAINE HYDROCHLORIDE 25 ML: 2.5 INJECTION, SOLUTION EPIDURAL; INFILTRATION; INTRACAUDAL; PERINEURAL at 10:50

## 2024-01-05 RX ADMIN — EPHEDRINE SULFATE 5 MG: 50 INJECTION INTRAVENOUS at 11:56

## 2024-01-05 RX ADMIN — CEFAZOLIN 1000 MG: 1 INJECTION, POWDER, FOR SOLUTION INTRAMUSCULAR; INTRAVENOUS; PARENTERAL at 11:18

## 2024-01-05 RX ADMIN — MIDAZOLAM 2 MG: 1 INJECTION INTRAMUSCULAR; INTRAVENOUS at 10:50

## 2024-01-05 RX ADMIN — PROPOFOL 50 MCG/KG/MIN: 10 INJECTION, EMULSION INTRAVENOUS at 11:14

## 2024-01-05 RX ADMIN — KETOROLAC TROMETHAMINE 30 MG: 30 INJECTION, SOLUTION INTRAMUSCULAR; INTRAVENOUS at 12:09

## 2024-01-05 RX ADMIN — CHLORHEXIDINE GLUCONATE 0.12% ORAL RINSE 15 ML: 1.2 LIQUID ORAL at 10:31

## 2024-01-05 NOTE — OP NOTE
OPERATIVE REPORT  PATIENT NAME: Manju Elizondo  :  1960  MRN: [id number]  Pt Location: [facility]    SURGERY DATE: 24    Surgeons and Role:     * Champ Restrepo MD - Primary     * Ruth Simpson PA-C - Assisting     * Yousuf Humphrey MD - Assisting    Pre-Op Diagnosis:  Injury of right thumb, initial encounter [S69.91XA]  Rupture of ulnar collateral ligament of right thumb, initial encounter [S63.641A]    Post-Op Diagnosis Codes:     * Injury of right thumb, initial encounter [S69.91XA]     * Rupture of ulnar collateral ligament of right thumb, initial encounter [S63.641A]    Procedure  Right thumb UCL repair  Specimen(s):  No specimens collected during this procedure.    Estimated Blood Loss:   Minimal    Anesthesia Type:   Regional with Sedation    IMPLANTS:  Implant Name Type Inv. Item Serial No.  Lot No. LRB No. Used Action   ANCHOR SUT MICRO CORKSCREW FT 2-0 - [id number]  ANCHOR SUT MICRO CORKSCREW FT 2-0  [id number] Right 1 Implanted   ANCHOR SUT MICRO CORKSCREW FT 2-0 - [id number]  ANCHOR SUT MICRO CORKSCREW FT 2-0  [employer] [id number] Right 1 Implanted       PERIOPERATIVE ANTIBIOTICS:    cefazolin, 2 grams    Tourniquet Time: 40 min  at 250 mmHg          Operative Indications:  The patient has a history of Thumb UCL Tear  right that was recalcitrant to conservative management.  The decision was made to bring the patient to the operating room for Ulnar Collateral Ligament repair of the thumb  right.  Risks of the procedure were explained which include, but are not limited to bleeding; infection; damage to nerves, arteries,veins, tendons; scar; pain; need for reoperation; failure to give desired result; and risks of anaesthesia.  All questions were answered to satisfaction and they were willing to proceed.         Operative Findings:  Complete UCL tear with Stener lesion     Complications:   None    Procedure and Technique:  After the patient, site, and procedure  were identified, the patient was brought into the operating room in a supine position.  Regional and general anaesthesia were provided.  A well padded tourniquet was applied to the extremity, set at 250 mmHg.  The  right upper extremity was then prepped and drapped in a normal, sterile, orthopedic fashion.    A curvilinear incision was made at the dorsal aspect of the metacarpal extending toward the ulnar aspect of the proximal phalanx.  The superficial sensory nerve and superficial neurovascular structures were protected.  The interval between the adductor pollicis and the extensor pollicis longus was entered.  A complete tear of the UCL was then identified with a Stener lesion .  The bony insertion point at the base of the proximal phalanx was then prepared to bleeding bone.  A freer was placed within the metacarpophalangeal joint demonstrating no significant evidence of joint destruction or articular cartilaginous damage .  Two 2.5 mm Arthrex corkscrew anchors were  placed at the insertion site and the four  limbs used to restore the ligament  to its anatomic position. The repair was reinforced with a capsular closure. At this point, stress testing demonstrated excellent stability of the thumb metacarpophalangeal joint.  The thumb was brought through a full range of motion demonstrating good mobility.  No instability was noted.  The adductor aponeurosis was repaired .  The extensor albright was also repaired in a running fashion      At the completion of the procedure, hemostasis was obtained with cautery and direct pressure.  The wounds were copiously irrigated with sterile solution.  The wounds were closed with Monocryl and Steri-strips.  Sterile dressings were applied, including Xeroform, gauze, tweeners, webril, ACE, Thumb Spica Splint, and Sling.  Please note, all sponge, needle, and instrument counts were correct prior to closure.  Loupe magnification was utilized.  The patient tolerated the procedure well.      I was present for the entire procedure., A qualified resident physician was not available., and A physician assistant was required during the procedure for retraction, tissue handling, dissection and suturing.    Patient Disposition:  PACU     SIGNATURE: Champ Restrepo MD  DATE: 01/05/24  TIME: 12:11 PM

## 2024-01-05 NOTE — ANESTHESIA POSTPROCEDURE EVALUATION
Post-Op Assessment Note    CV Status:  Stable  Pain Score: 0    Pain management: adequate       Mental Status:  Alert and awake   Hydration Status:  Euvolemic   PONV Controlled:  Controlled   Airway Patency:  Patent     Post Op Vitals Reviewed: Yes      Staff: CRNA               /59 (01/05/24 1230)    Temp 98.4 °F (36.9 °C) (01/05/24 1230)    Pulse 78 (01/05/24 1230)   Resp 22 (01/05/24 1230)    SpO2 99 % (01/05/24 1230)

## 2024-01-05 NOTE — DISCHARGE INSTR - AVS FIRST PAGE
Post Operative Instructions    You have had surgery on your arm today, please read and follow the information below:  Elevate your hand above your elbow during the next 24-48 hours to help with swelling.  Place your hand and arm over your head with motion at your shoulder three times a day.  Do not apply any cream/ointment/oil to your incisions including antibiotics.  Do not soak your hands in standing water (dishwater, tubs, Jacuzzi's, pools, etc.) until given permission (typically 2-3 weeks after injury)    Call the office if you notice any:  Increased numbness or tingling of your hand or fingers that is not relieved with elevation.  Increasing pain that is not controlled with medication.  Difficulty chewing, breathing, swallowing.  Chest pains or shortness of breath.  Fever over 101.4 degrees.    Bandage: Please keep bandages clean and dry. Do NOT remove bandage until follow-up appointment.    Please do NOT put any topical agents on the surgical wound including neosporin, peroxide, tea tree oil, vitamin E, etc. as these can delay wound healing.    Motion: Move fingers into a fist 5 times a day, DO NOT move any splinted fingers.    Weight bearing status: The operated extremity should be non-weight bearing until further notice.    Ice: Ice for 10 minutes every hour as needed for swelling x 24 hours.    Sling: Sling for comfort for 2-3 days. Discontinue when comfortable.     Pain medication:   Naproxen 220 mg two times a day (this is over the counter!)  *do not take this medication if you were told by your doctor that you cannot take anti-inflammatories or NSAIDS  Tylenol Extended Release 650 mg every 8 hours (this is over the counter!)   Norco/Hydrocodone one tab every 6 hours ONLY AS NEEDED for severe pain        Follow-up Appointment: 10-14 days with Dr Restrepo        Please call the office if you have any questions or concerns regarding your post-operative care.

## 2024-01-05 NOTE — ANESTHESIA PROCEDURE NOTES
Peripheral Block    Patient location during procedure: holding area  Start time: 1/5/2024 10:50 AM  Reason for block: at surgeon's request and post-op pain management  Staffing  Performed by: Rena Goss MD  Authorized by: Rena Goss MD    Preanesthetic Checklist  Completed: patient identified, IV checked, site marked, risks and benefits discussed, surgical consent, monitors and equipment checked, pre-op evaluation and timeout performed  Peripheral Block  Patient position: supine  Prep: ChloraPrep  Patient monitoring: frequent blood pressure checks, continuous pulse oximetry and heart rate  Block type: Supraclavicular  Laterality: right  Injection technique: single-shot  Procedures: ultrasound guided, Ultrasound guidance required for the procedure to increase accuracy and safety of medication placement and decrease risk of complications.  Ultrasound permanent image savedbupivacaine (PF) (MARCAINE) 0.25 % injection 20 mL - Perineural   25 mL - 1/5/2024 10:50:00 AM  Needle  Needle type: Stimuplex   Needle gauge: 22 G  Needle length: 4 in  Needle localization: anatomical landmarks and ultrasound guidance  Assessment  Injection assessment: incremental injection, frequent aspiration, injected with ease, negative aspiration, negative for heart rate change, no paresthesia on injection, no symptoms of intraneural/intravenous injection and needle tip visualized at all times  Paresthesia pain: none  Post-procedure:  site cleaned  patient tolerated the procedure well with no immediate complications

## 2024-01-17 ENCOUNTER — OFFICE VISIT (OUTPATIENT)
Dept: OBGYN CLINIC | Facility: CLINIC | Age: 64
End: 2024-01-17

## 2024-01-17 ENCOUNTER — OFFICE VISIT (OUTPATIENT)
Dept: OCCUPATIONAL THERAPY | Facility: CLINIC | Age: 64
End: 2024-01-17
Payer: OTHER MISCELLANEOUS

## 2024-01-17 VITALS
SYSTOLIC BLOOD PRESSURE: 130 MMHG | HEIGHT: 66 IN | BODY MASS INDEX: 20.89 KG/M2 | HEART RATE: 90 BPM | DIASTOLIC BLOOD PRESSURE: 82 MMHG | WEIGHT: 130 LBS

## 2024-01-17 DIAGNOSIS — Z98.890 S/P MUSCULOSKELETAL SYSTEM SURGERY: ICD-10-CM

## 2024-01-17 DIAGNOSIS — S63.641D RUPTURE OF ULNAR COLLATERAL LIGAMENT OF RIGHT THUMB, SUBSEQUENT ENCOUNTER: Primary | ICD-10-CM

## 2024-01-17 PROCEDURE — 99024 POSTOP FOLLOW-UP VISIT: CPT | Performed by: SURGERY

## 2024-01-17 PROCEDURE — 97760 ORTHOTIC MGMT&TRAING 1ST ENC: CPT | Performed by: OCCUPATIONAL THERAPIST

## 2024-01-17 NOTE — PROGRESS NOTES
Assessment/Plan:  Patient ID: Manju Elizondo 63 y.o. female   Surgery: Repair Ucl Thumb - Right  Date of Surgery: 1/5/2024    12 days s/p above surgery.   Suture tails removed   Begin therapy per protocol  Custom splint x4 weeks - may remove for hygiene and therapy exercises   Keep out of work for the time being. 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 year old female who presents for follow up after Repair Ucl Thumb - Right. Today patient has some mild soreness at the surgical site and states it feels strange coming out of the splint. Otherwise doing well.       PHYSICAL EXAMINATION:  General: well developed and well nourished, alert, oriented times 3, and appears comfortable  Psychiatric: Normal    MUSCULOSKELETAL EXAMINATION:  Incision: Clean, dry, intact  Surgery Site: normal, no evidence of infection   Range of Motion: As expected  Neurovascular status: Neuro intact, good cap refill  Activity Restrictions: Cast/splint restrictions  Done today: Sutures out      STUDIES REVIEWED:  No Studies to review      PROCEDURES PERFORMED:  Procedures  No Procedures performed today    Ruth Simpson

## 2024-01-17 NOTE — PROGRESS NOTES
Orthosis    Diagnosis:   1. Rupture of ulnar collateral ligament of right thumb, subsequent encounter          Indication: Motion Blocking    Location: Right  hand and thumb  Supplies: Custom Fit Orthotic  Orthosis type: Hand-Based SPICA  Wearing Schedule: Remove for hygiene only  Describe Position: IP free, thumb neutral MP, mid abduction     Precautions: Universal (skin contact/breakdown)    Patient or Caregiver expresses understanding of wearing Schedule and Precautions? Yes  Patient or Caregiver able to don/doff orthotic independently?Yes    Written orders provided to patient? Yes  Orders Obtained: Written  Orders Obtained from: Lauro    Return for evaluation and treatment Yes

## 2024-01-17 NOTE — LETTER
January 17, 2024     Patient: Manju Elizondo  YOB: 1960  Date of Visit: 1/17/2024      To Whom it May Concern:    Manju Elizondo is under my professional care. Manju was seen in my office on 1/17/2024. Manju will be out of work with no use of the right hand at this time. I anticipate these restrictions will  apply for the next 4 weeks until re-evaluated, we will provide an updated note if this changes.     If you have any questions or concerns, please don't hesitate to call.         Sincerely,          Champ Restrepo MD        CC: No Recipients

## 2024-01-22 ENCOUNTER — EVALUATION (OUTPATIENT)
Dept: OCCUPATIONAL THERAPY | Age: 64
End: 2024-01-22
Payer: OTHER MISCELLANEOUS

## 2024-01-22 ENCOUNTER — TELEPHONE (OUTPATIENT)
Age: 64
End: 2024-01-22

## 2024-01-22 DIAGNOSIS — Z98.890 S/P MUSCULOSKELETAL SYSTEM SURGERY: ICD-10-CM

## 2024-01-22 DIAGNOSIS — S63.641D RUPTURE OF ULNAR COLLATERAL LIGAMENT OF RIGHT THUMB, SUBSEQUENT ENCOUNTER: ICD-10-CM

## 2024-01-22 PROCEDURE — 97110 THERAPEUTIC EXERCISES: CPT | Performed by: OCCUPATIONAL THERAPIST

## 2024-01-22 PROCEDURE — 97165 OT EVAL LOW COMPLEX 30 MIN: CPT | Performed by: OCCUPATIONAL THERAPIST

## 2024-01-22 NOTE — TELEPHONE ENCOUNTER
Caller: Patient    Doctor: Lauro    Reason for call: Patient stated Tangela JONES requested an updated work status for 1/5 R [facility] sx to reflect OT recommendations of 2 days per week with a maximum of 4 hours per day until further notice.      Please fax updated work status to Darrel Becerra at: [phone]  Phone: [phone]    Call back#: [phone] (Manju)

## 2024-01-22 NOTE — PROGRESS NOTES
OT Evaluation     Today's date: 2024  Patient name: Manju Elizondo  : 1960  MRN: [id number]  Referring provider: Ruth Simpson PA-C  Dx: No diagnosis found.               Assessment  Assessment details: Manju is S/P R UCL repair 24 after a heavy object forcibly hyperextended her thumb. She has pain, surgical restrictions, stiffness and swelling affecting her ability to engage in ADLs/IADLS.    Goals  STG) Motion increased by 25% in 4-6 weeks to facilitate dressing   STG) Strength/Motor skills improved by 25% in 4-6 weeks to facilitate simple meal prep  STG) Swelling\Edema improved by 25% in 4-6 weeks  to facilitate hygiene  STG) Pain decreased by 25% in 4-6 weeks to facilitate grooming    LTG) ADL and IADL skills improved   LTG) Work skills improved  LTG) Leisure skills improved    Patient Goals: To gain full use of her thumb      Goals, plan of care and treatment condition discussed with patient. Patient expresses their understanding and questions regarding these issues were addressed.      Plan  Patient would benefit from: OT eval and custom splinting  Planned modality interventions: TENS, thermotherapy: hydrocollator packs, thermotherapy: paraffin bath and ultrasound  Planned therapy interventions: neuromuscular re-education, motor coordination training, manual therapy, joint mobilization, Hensley taping, strengthening, stretching, therapeutic activities, therapeutic exercise, functional ROM exercises, fine motor coordination training and orthotic fitting/training  Frequency: 2x week  Duration in visits: 10  Treatment plan discussed with: patient        Subjective Evaluation    History of Present Illness  Mechanism of injury: UCL repair 24; painting came off wall and hyperextended thumb.  Pain  Current pain ratin  At worst pain ratin  Quality: discomfort    Hand dominance: right          Objective     Active Range of Motion     Right Wrist   Wrist flexion: 70 degrees   Wrist  extension: 55 degrees   Radial deviation: 15 degrees   Ulnar deviation: 45 degrees     Right Thumb   Flexion     MP: 35    DIP: 30  Palmar Abduction    CMC: 45  Radial Abduction    CMC: 50    Tests     Additional Tests Details  Stable to lateral stress  Numbness at incision site    Swelling     Right Wrist/Hand   Thumb     Proximal: 6.9 cm  Circumference MCP: 19.6 cm             Precautions: UCL Repair     Thumb UCL/RCL Repair   Week 1-2  HFO fabricated with the MP joint in 45 degrees of flexion, IP free  Edema management   Week 3-4  Scar massage   Edema management   AROM begins for the thumb  For UCL repairs, the thumb is actively flexed along the palm of the hand in palmar adduction   Week 5  AAROM for the thumb   Week 6  PROM for the thumb,   For UCL repairs, with the thumb adducted to avoid lateral stress on the repair   Week 7-8  Begin to wean orthotic during light ADLS, for complete discontinuation by week 8    HEP: Thumb, wrist AROM (thumb flexion with adduction)      Manuals             Scar mobs                                                    Neuro Re-Ed                                                                                                        Ther Ex             Thumb hops                                                                                                        Ther Activity             translation             opposition             In hand manip 4 planes tb                                      Modalities

## 2024-01-23 NOTE — TELEPHONE ENCOUNTER
Thank you, can you please place work note with the above restrictions into patients chart and fax is over

## 2024-01-24 ENCOUNTER — OFFICE VISIT (OUTPATIENT)
Dept: OCCUPATIONAL THERAPY | Age: 64
End: 2024-01-24
Payer: OTHER MISCELLANEOUS

## 2024-01-24 DIAGNOSIS — Z98.890 S/P MUSCULOSKELETAL SYSTEM SURGERY: Primary | ICD-10-CM

## 2024-01-24 DIAGNOSIS — S63.641D RUPTURE OF ULNAR COLLATERAL LIGAMENT OF RIGHT THUMB, SUBSEQUENT ENCOUNTER: ICD-10-CM

## 2024-01-24 PROCEDURE — 97110 THERAPEUTIC EXERCISES: CPT | Performed by: OCCUPATIONAL THERAPIST

## 2024-01-24 NOTE — PROGRESS NOTES
"Daily Note     Today's date: 2024  Patient name: Manju Elizondo  : 1960  MRN: [id number]  Referring provider: Ruth Simpson PA-C  Dx:   Encounter Diagnosis     ICD-10-CM    1. S/P musculoskeletal system surgery  Z98.890       2. Rupture of ulnar collateral ligament of right thumb, subsequent encounter  S63.641D                      Subjective: \"It's a bit sore\"      Objective: See treatment diary below      Assessment: Good tolerance and performance, AROM grossly improving      Plan: Continue per plan of care.      Precautions: UCL Repair     Thumb UCL/RCL Repair   Week 1-2  HFO fabricated with the MP joint in 45 degrees of flexion, IP free  Edema management   Week 3-4  Scar massage   Edema management   AROM begins for the thumb  For UCL repairs, the thumb is actively flexed along the palm of the hand in palmar adduction   Week 5  AAROM for the thumb   Week 6  PROM for the thumb,   For UCL repairs, with the thumb adducted to avoid lateral stress on the repair   Week 7-8  Begin to wean orthotic during light ADLS, for complete discontinuation by week 8    HEP: Thumb, wrist AROM (thumb flexion with adduction)      Manuals             Scar mobs 5                                                   Neuro Re-Ed             Modified opposition Marbles (roll, tips),  sm pegs            translation marbles            Modified pen use 4 corner taps, diag lines tabletop                                                                Ther Ex             Thumb hops 3cm            In hand manip 5x 4 planes TB                                                                                          Ther Activity             translation             opposition             In hand manip 4 planes tb                                      Modalities                                            "

## 2024-01-29 ENCOUNTER — OFFICE VISIT (OUTPATIENT)
Dept: OCCUPATIONAL THERAPY | Age: 64
End: 2024-01-29
Payer: OTHER MISCELLANEOUS

## 2024-01-29 DIAGNOSIS — S63.641D RUPTURE OF ULNAR COLLATERAL LIGAMENT OF RIGHT THUMB, SUBSEQUENT ENCOUNTER: ICD-10-CM

## 2024-01-29 DIAGNOSIS — Z98.890 S/P MUSCULOSKELETAL SYSTEM SURGERY: Primary | ICD-10-CM

## 2024-01-29 PROCEDURE — 97110 THERAPEUTIC EXERCISES: CPT | Performed by: OCCUPATIONAL THERAPIST

## 2024-01-29 PROCEDURE — 97140 MANUAL THERAPY 1/> REGIONS: CPT | Performed by: OCCUPATIONAL THERAPIST

## 2024-01-29 NOTE — PROGRESS NOTES
"Daily Note     Today's date: 2024  Patient name: Manju Elizondo  : 1960  MRN: [id number]  Referring provider: Ruth Simpson PA-C  Dx:   Encounter Diagnosis     ICD-10-CM    1. S/P musculoskeletal system surgery  Z98.890       2. Rupture of ulnar collateral ligament of right thumb, subsequent encounter  S63.641D                      Subjective: \"It's a little sore\"      Objective: See treatment diary below      Assessment: Good tolerance, AROM improving grossly, on schedule for protocol  Plan: Continue per plan of care.      Precautions: UCL Repair     Thumb UCL/RCL Repair   Week 1-2  HFO fabricated with the MP joint in 45 degrees of flexion, IP free  Edema management   Week 3-4  Scar massage   Edema management   AROM begins for the thumb  For UCL repairs, the thumb is actively flexed along the palm of the hand in palmar adduction   Week 5  AAROM for the thumb   Week 6  PROM for the thumb,   For UCL repairs, with the thumb adducted to avoid lateral stress on the repair   Week 7-8  Begin to wean orthotic during light ADLS, for complete discontinuation by week 8    HEP: Thumb, wrist AROM (thumb flexion with adduction)      Manuals            Scar mobs 5 5                                                  Neuro Re-Ed             Modified opposition Marbles (roll, tips),  sm pegs Marbles, ballbearings (roll, tips),  KP           translation marbles Pennies           Modified pen use 4 corner taps, diag lines tabletop 4 corner taps, diag lines tabletop           AAROM  5                                                  Ther Ex             Thumb hops 3cm            In hand manip 5x 4 planes TB                                                                                          Ther Activity             translation             opposition             In hand manip 4 planes tb                                      Modalities                                              "

## 2024-01-31 ENCOUNTER — OFFICE VISIT (OUTPATIENT)
Dept: OCCUPATIONAL THERAPY | Age: 64
End: 2024-01-31
Payer: OTHER MISCELLANEOUS

## 2024-01-31 DIAGNOSIS — Z98.890 S/P MUSCULOSKELETAL SYSTEM SURGERY: Primary | ICD-10-CM

## 2024-01-31 DIAGNOSIS — S63.641D RUPTURE OF ULNAR COLLATERAL LIGAMENT OF RIGHT THUMB, SUBSEQUENT ENCOUNTER: ICD-10-CM

## 2024-01-31 PROCEDURE — 97140 MANUAL THERAPY 1/> REGIONS: CPT | Performed by: OCCUPATIONAL THERAPIST

## 2024-01-31 PROCEDURE — 97110 THERAPEUTIC EXERCISES: CPT | Performed by: OCCUPATIONAL THERAPIST

## 2024-01-31 NOTE — PROGRESS NOTES
"Daily Note     Today's date: 2024  Patient name: Manju Elizondo  : 1960  MRN: [id number]  Referring provider: Ruth Simpson PA-C  Dx:   Encounter Diagnosis     ICD-10-CM    1. S/P musculoskeletal system surgery  Z98.890       2. Rupture of ulnar collateral ligament of right thumb, subsequent encounter  S63.641D                      Subjective: \"Just a little sore\"      Objective: See treatment diary below      Assessment: Some stiffness/soreness but able to oppose to base of SF without pain.    Plan: Continue per plan of care.      Precautions: UCL Repair     Thumb UCL/RCL Repair   Week 1-2  HFO fabricated with the MP joint in 45 degrees of flexion, IP free  Edema management   Week 3-4  Scar massage   Edema management   AROM begins for the thumb  For UCL repairs, the thumb is actively flexed along the palm of the hand in palmar adduction   Week 5  AAROM for the thumb   Week 6  PROM for the thumb,   For UCL repairs, with the thumb adducted to avoid lateral stress on the repair   Week 7-8  Begin to wean orthotic during light ADLS, for complete discontinuation by week 8    HEP: Thumb, wrist AROM (thumb flexion with adduction)      Manuals           Scar mobs 5 5 5                                                 Neuro Re-Ed             Modified opposition Marbles (roll, tips),  sm pegs Marbles, ballbearings (roll, tips),  KP Marbles, ballbearings (roll, tips),  KP          translation marbles Pennies Pennies, KP          Modified pen use 4 corner taps, diag lines tabletop 4 corner taps, diag lines tabletop 4 corner taps, diag lines tabletop          AAROM  5 5                                                 Ther Ex             Thumb hops 3cm  3 cm          In hand manip 5x 4 planes TB  5x 4 planes tb                                                                                        Ther Activity             translation             opposition             In hand manip 4 planes tb        "                               Modalities             P   5

## 2024-02-05 ENCOUNTER — OFFICE VISIT (OUTPATIENT)
Dept: OCCUPATIONAL THERAPY | Age: 64
End: 2024-02-05
Payer: OTHER MISCELLANEOUS

## 2024-02-05 DIAGNOSIS — Z98.890 S/P MUSCULOSKELETAL SYSTEM SURGERY: ICD-10-CM

## 2024-02-05 DIAGNOSIS — S63.641D RUPTURE OF ULNAR COLLATERAL LIGAMENT OF RIGHT THUMB, SUBSEQUENT ENCOUNTER: Primary | ICD-10-CM

## 2024-02-05 PROCEDURE — 97110 THERAPEUTIC EXERCISES: CPT | Performed by: OCCUPATIONAL THERAPIST

## 2024-02-05 NOTE — PROGRESS NOTES
"Daily Note     Today's date: 2024  Patient name: Manju Elizondo  : 1960  MRN: [id number]  Referring provider: Ruth Simpson PA-C  Dx:   Encounter Diagnosis     ICD-10-CM    1. Rupture of ulnar collateral ligament of right thumb, subsequent encounter  S63.641D       2. S/P musculoskeletal system surgery  Z98.890                      Subjective: \"It's been good\"      Objective: See treatment diary below      Assessment: Good tolerance, no acute instability, near full AROM    Plan: Continue per plan of care.      Precautions: UCL Repair 1    Thumb UCL/RCL Repair   Week 1-2  HFO fabricated with the MP joint in 45 degrees of flexion, IP free  Edema management   Week 3-4  Scar massage   Edema management   AROM begins for the thumb  For UCL repairs, the thumb is actively flexed along the palm of the hand in palmar adduction   Week 5  AAROM for the thumb   Week 6  PROM for the thumb,   For UCL repairs, with the thumb adducted to avoid lateral stress on the repair   Week 7-8  Begin to wean orthotic during light ADLS, for complete discontinuation by week 8    HEP: Thumb, wrist AROM (thumb flexion with adduction)      Manuals  2/5         Scar mobs 5 5 5                                                 Neuro Re-Ed             Modified opposition Marbles (roll, tips),  sm pegs Marbles, ballbearings (roll, tips),  KP Marbles, ballbearings (roll, tips),  KP Marbles, ballbearings (roll, tips),  KP         translation marbles Pennies Pennies, KP Pennies, KP         Modified pen use 4 corner taps, diag lines tabletop 4 corner taps, diag lines tabletop 4 corner taps, diag lines tabletop 4 corner taps, diag lines tabletop         AAROM  5 5 5                                                Ther Ex             Thumb hops 3cm  3 cm 3 cm         In hand manip 5x 4 planes TB  5x 4 planes tb 5x 4 planes tb                                                                                       Ther Activity          "    translation             opposition             In hand manip 4 planes tb                                      Modalities             P   5 5

## 2024-02-07 ENCOUNTER — OFFICE VISIT (OUTPATIENT)
Dept: OCCUPATIONAL THERAPY | Age: 64
End: 2024-02-07
Payer: OTHER MISCELLANEOUS

## 2024-02-07 DIAGNOSIS — Z98.890 S/P MUSCULOSKELETAL SYSTEM SURGERY: ICD-10-CM

## 2024-02-07 DIAGNOSIS — S63.641D RUPTURE OF ULNAR COLLATERAL LIGAMENT OF RIGHT THUMB, SUBSEQUENT ENCOUNTER: Primary | ICD-10-CM

## 2024-02-07 PROCEDURE — 97140 MANUAL THERAPY 1/> REGIONS: CPT | Performed by: OCCUPATIONAL THERAPIST

## 2024-02-07 PROCEDURE — 97110 THERAPEUTIC EXERCISES: CPT | Performed by: OCCUPATIONAL THERAPIST

## 2024-02-07 NOTE — PROGRESS NOTES
"Daily Note     Today's date: 2024  Patient name: Manju Elizondo  : 1960  MRN: [id number]  Referring provider: Ruth Simpson PA-C  Dx:   Encounter Diagnosis     ICD-10-CM    1. Rupture of ulnar collateral ligament of right thumb, subsequent encounter  S63.641D       2. S/P musculoskeletal system surgery  Z98.890                      Subjective: \"It feels good\"      Objective: See treatment diary below      Assessment: Good AROM, no pain. RE next session      Plan: Continue per plan of care.      Precautions: UCL Repair     Thumb UCL/RCL Repair   Week 1-2  HFO fabricated with the MP joint in 45 degrees of flexion, IP free  Edema management   Week 3-4  Scar massage   Edema management   AROM begins for the thumb  For UCL repairs, the thumb is actively flexed along the palm of the hand in palmar adduction   Week 5  AAROM for the thumb   Week 6  PROM for the thumb,   For UCL repairs, with the thumb adducted to avoid lateral stress on the repair   Week 7-8  Begin to wean orthotic during light ADLS, for complete discontinuation by week 8    HEP: Thumb, wrist AROM (thumb flexion with adduction)      Manuals         Scar mobs 5 5 5  5                                               Neuro Re-Ed             Modified opposition Marbles (roll, tips),  sm pegs Marbles, ballbearings (roll, tips),  KP Marbles, ballbearings (roll, tips),  KP Marbles, ballbearings (roll, tips),  KP Marbles, ballbearings (roll, tips),  KP        translation marbles Pennies Pennies, KP Pennies, KP Pennies, KP        Modified pen use 4 corner taps, diag lines tabletop 4 corner taps, diag lines tabletop 4 corner taps, diag lines tabletop 4 corner taps, diag lines tabletop 4 corner taps, diag lines tabletop        AAROM  5 5 5 5                                               Ther Ex             Thumb hops 3cm  3 cm 3 cm         In hand manip 5x 4 planes TB  5x 4 planes tb 5x 4 planes tb 5x 4 planes tb                      "                                                                 Ther Activity             translation             opposition             In hand manip 4 planes tb                                      Modalities             [facility]   5 5 5

## 2024-02-12 ENCOUNTER — APPOINTMENT (OUTPATIENT)
Dept: OCCUPATIONAL THERAPY | Age: 64
End: 2024-02-12
Payer: OTHER MISCELLANEOUS

## 2024-02-13 ENCOUNTER — APPOINTMENT (OUTPATIENT)
Dept: OCCUPATIONAL THERAPY | Age: 64
End: 2024-02-13
Payer: OTHER MISCELLANEOUS

## 2024-02-14 ENCOUNTER — APPOINTMENT (OUTPATIENT)
Dept: OCCUPATIONAL THERAPY | Age: 64
End: 2024-02-14
Payer: OTHER MISCELLANEOUS

## 2024-02-14 ENCOUNTER — OFFICE VISIT (OUTPATIENT)
Dept: OBGYN CLINIC | Facility: CLINIC | Age: 64
End: 2024-02-14

## 2024-02-14 ENCOUNTER — OFFICE VISIT (OUTPATIENT)
Dept: OCCUPATIONAL THERAPY | Age: 64
End: 2024-02-14
Payer: OTHER MISCELLANEOUS

## 2024-02-14 VITALS
HEART RATE: 75 BPM | DIASTOLIC BLOOD PRESSURE: 77 MMHG | HEIGHT: 66 IN | WEIGHT: 130 LBS | SYSTOLIC BLOOD PRESSURE: 126 MMHG | BODY MASS INDEX: 20.89 KG/M2

## 2024-02-14 DIAGNOSIS — S63.641D RUPTURE OF ULNAR COLLATERAL LIGAMENT OF RIGHT THUMB, SUBSEQUENT ENCOUNTER: Primary | ICD-10-CM

## 2024-02-14 PROCEDURE — 99024 POSTOP FOLLOW-UP VISIT: CPT | Performed by: SURGERY

## 2024-02-14 PROCEDURE — 97110 THERAPEUTIC EXERCISES: CPT | Performed by: OCCUPATIONAL THERAPIST

## 2024-02-14 PROCEDURE — 97168 OT RE-EVAL EST PLAN CARE: CPT | Performed by: OCCUPATIONAL THERAPIST

## 2024-02-14 NOTE — LETTER
February 14, 2024     Patient: Manju Elizondo  YOB: 1960  Date of Visit: 2/14/2024      To Whom it May Concern:    Manju Elizondo is under my professional care. Manju was seen in my office on 2/14/2024. Manju will continue her current work restrictions working 2 days per week, 4 hours at a time. These restrictions will apply for the next 4 weeks until she is re-evaluated.     If you have any questions or concerns, please don't hesitate to call.         Sincerely,          Champ Restrepo MD        CC: No Recipients

## 2024-02-14 NOTE — PROGRESS NOTES
Assessment/Plan:  Patient ID: Manju Elizondo 63 y.o. female   Surgery: Repair Ucl Thumb - Right  Date of Surgery: 1/5/2024    Begin weaning out of brace 2 hrs per day, may maintain for heavier activity   Continue therapy per protocol  She does have some intrinsic atrophy on the right, no other ulnar nerve symptoms. Will add intrinsic strengthening to therapy protocol and keep an eye on this. If no improvement will consider EMG in the future   Continue part time work restrictions   Follow up in 4 weeks     Follow Up:  4  week(s)    To Do Next Visit:         CHIEF COMPLAINT:  Chief Complaint   Patient presents with    Follow-up     Patient is doing well she says her ROM is much better she feels stronger          SUBJECTIVE:  Manju Elizondo is a 63 y.o. year old female who presents for follow up after Repair Ucl Thumb - Right. Today patient has improved pain and brittany-incisional numbness. She has been brace compliant and attends therapy regularly. Overall she is doing very well and progressing.       PHYSICAL EXAMINATION:  General: well developed and well nourished, alert, oriented times 3, and appears comfortable  Psychiatric: Normal    MUSCULOSKELETAL EXAMINATION:  Incision: Clean, dry, intact and healed  Surgery Site: normal, no evidence of infection   Range of Motion: opposition intact and full composite fist possible  Neurovascular status: Neuro intact, good cap refill. She does have some intrinsic atrophy on the right more so than the left. No other positive ulnar nerve testing  Activity Restrictions: begin weaning out of brace, continue therapy per protocol.          STUDIES REVIEWED:  No Studies to review      PROCEDURES PERFORMED:  Procedures  No Procedures performed today    Scribe Attestation      I,:  Ruth Simpson PA-C am acting as a scribe while in the presence of the attending physician.:       I,:  Champ Restrepo MD personally performed the services described in this documentation    as scribed in  my presence.:

## 2024-02-14 NOTE — PROGRESS NOTES
OT Re-Evaluation     Today's date: 2024  Patient name: Manju Elizondo  : 1960  MRN: [id number]  Referring provider: Ruth Simpson PA-C  Dx:   Encounter Diagnosis     ICD-10-CM    1. Rupture of ulnar collateral ligament of right thumb, subsequent encounter  S63.641D                      Assessment  Assessment details: Manju is S/P R UCL repair 24 after a heavy object forcibly hyperextended her thumb. Her pain, swelling and AROM are much improved. She has been cleared  for strengthening/conditioning. Recommend continued tx.    Goals  STG) Motion increased by 25% in 4-6 weeks to facilitate dressing MET  STG) Strength/Motor skills improved by 25% in 4-6 weeks to facilitate simple meal prep  STG) Swelling\Edema improved by 25% in 4-6 weeks  to facilitate hygiene MET  STG) Pain decreased by 25% in 4-6 weeks to facilitate grooming MET    LTG) ADL and IADL skills improved   LTG) Work skills improved  LTG) Leisure skills improved    Patient Goals: To gain full use of her thumb      Goals, plan of care and treatment condition discussed with patient. Patient expresses their understanding and questions regarding these issues were addressed.      Plan  Patient would benefit from: OT eval and custom splinting  Planned modality interventions: TENS, thermotherapy: hydrocollator packs, thermotherapy: paraffin bath and ultrasound  Planned therapy interventions: neuromuscular re-education, motor coordination training, manual therapy, joint mobilization, Hensley taping, strengthening, stretching, therapeutic activities, therapeutic exercise, functional ROM exercises, fine motor coordination training and orthotic fitting/training  Frequency: 2x week  Duration in visits: 10  Treatment plan discussed with: patient      Subjective Evaluation    History of Present Illness  Mechanism of injury: UCL repair 24; painting came off wall and hyperextended thumb.  Pain  Current pain ratin  At worst pain rating:  0  Quality: discomfort    Hand dominance: right        Objective     Active Range of Motion     Right Wrist   Wrist flexion: 70 degrees   Wrist extension: 68 degrees   Radial deviation: 20 degrees   Ulnar deviation: 40 degrees     Right Thumb   Flexion     MP: 54    DIP: 55  Palmar Abduction    CMC: 55  Radial Abduction    CMC: 55    Strength/Myotome Testing     Left Wrist/Hand      (2nd hand position)     Trial 1: 39.4    Thumb Strength  Key/Lateral Pinch     Trial 1: 12  Tip/Two-Point Pinch     Trial 1: 4  Palmar/Three-Point Pinch     Trial 1: 7    Right Wrist/Hand      (2nd hand position)     Trial 1: 31.4    Thumb Strength   Key/Lateral Pinch     Trial 1: 9  Tip/Two-Point Pinch     Trial 1: 2  Palmar/Three-Point Pinch     Trial 1: 6    Tests     Additional Tests Details  SW 2.83 IF LF RF SF, 3.61 thumb    Swelling     Right Wrist/Hand   Thumb     Proximal: 6.6 cm  Circumference MCP: 19.2 cm             Precautions: UCL Repair     Thumb UCL/RCL Repair   Week 1-2  HFO fabricated with the MP joint in 45 degrees of flexion, IP free  Edema management   Week 3-4  Scar massage   Edema management   AROM begins for the thumb  For UCL repairs, the thumb is actively flexed along the palm of the hand in palmar adduction   Week 5  AAROM for the thumb   Week 6  PROM for the thumb,   For UCL repairs, with the thumb adducted to avoid lateral stress on the repair   Week 7-8  Begin to wean orthotic during light ADLS, for complete discontinuation by week 8    HEP: Thumb, wrist AROM (thumb flexion with adduction)      Manuals 2/14            Scar mobs                                                    Neuro Re-Ed                                                                                                        Ther Ex             Thumb hops                                                                                                        Ther Activity             Pinching YR [place] TT/Key; RCP + gripper G2             gripping RPW 3x10 Cyl, digicizer R3x10 ind; WF/WE YPB 3x10                                                   Modalities

## 2024-02-16 ENCOUNTER — OFFICE VISIT (OUTPATIENT)
Dept: OCCUPATIONAL THERAPY | Age: 64
End: 2024-02-16
Payer: OTHER MISCELLANEOUS

## 2024-02-16 DIAGNOSIS — S63.641D RUPTURE OF ULNAR COLLATERAL LIGAMENT OF RIGHT THUMB, SUBSEQUENT ENCOUNTER: Primary | ICD-10-CM

## 2024-02-16 DIAGNOSIS — Z98.890 S/P MUSCULOSKELETAL SYSTEM SURGERY: ICD-10-CM

## 2024-02-16 PROCEDURE — 97530 THERAPEUTIC ACTIVITIES: CPT | Performed by: OCCUPATIONAL THERAPIST

## 2024-02-16 PROCEDURE — 97112 NEUROMUSCULAR REEDUCATION: CPT | Performed by: OCCUPATIONAL THERAPIST

## 2024-02-16 NOTE — PROGRESS NOTES
"Daily Note     Today's date: 2024  Patient name: Manju Elizondo  : 1960  MRN: [id number]  Referring provider: Ruth Simpson PA-C  Dx:   Encounter Diagnosis     ICD-10-CM    1. Rupture of ulnar collateral ligament of right thumb, subsequent encounter  S63.641D       2. S/P musculoskeletal system surgery  Z98.890                      Subjective: \"It just feels worked\"      Objective: See treatment diary below      Assessment: Tolerated well with minimal pain    Plan: Continue per plan of care.      Precautions: UCL Repair     Thumb UCL/RCL Repair   Week 1-2  HFO fabricated with the MP joint in 45 degrees of flexion, IP free  Edema management   Week 3-4  Scar massage   Edema management   AROM begins for the thumb  For UCL repairs, the thumb is actively flexed along the palm of the hand in palmar adduction   Week 5  AAROM for the thumb   Week 6  PROM for the thumb,   For UCL repairs, with the thumb adducted to avoid lateral stress on the repair   Week 7-8  Begin to wean orthotic during light ADLS, for complete discontinuation by week 8    HEP: Thumb, wrist AROM (thumb flexion with adduction); R/G TP /pinch      Manuals            Scar mobs                                                    Neuro Re-Ed             KP  5x/time           Extension  Lrg rb x 25                                                                            Ther Ex             Thumb hops                                                                                                        Ther Activity             Pinching YR [place] TT/Key; RCP + gripper G2 RR [place] TT/Key; GCP + gripper G3           gripping RPW 3x10 Cyl, digicizer R3x10 ind; WF/WE YPB 3x10 RPW 3x10 Cyl; WF/WE RPB 3x10                                                  Modalities                                            "

## 2024-02-19 ENCOUNTER — OFFICE VISIT (OUTPATIENT)
Dept: OCCUPATIONAL THERAPY | Age: 64
End: 2024-02-19
Payer: OTHER MISCELLANEOUS

## 2024-02-19 DIAGNOSIS — S63.641D RUPTURE OF ULNAR COLLATERAL LIGAMENT OF RIGHT THUMB, SUBSEQUENT ENCOUNTER: Primary | ICD-10-CM

## 2024-02-19 DIAGNOSIS — Z98.890 S/P MUSCULOSKELETAL SYSTEM SURGERY: ICD-10-CM

## 2024-02-19 PROCEDURE — 97530 THERAPEUTIC ACTIVITIES: CPT | Performed by: OCCUPATIONAL THERAPIST

## 2024-02-19 PROCEDURE — 97112 NEUROMUSCULAR REEDUCATION: CPT | Performed by: OCCUPATIONAL THERAPIST

## 2024-02-19 NOTE — PROGRESS NOTES
"Daily Note     Today's date: 2024  Patient name: Manju Elizondo  : 1960  MRN: [id number]  Referring provider: Ruth Simpson PA-C  Dx:   Encounter Diagnosis     ICD-10-CM    1. Rupture of ulnar collateral ligament of right thumb, subsequent encounter  S63.641D       2. S/P musculoskeletal system surgery  Z98.890                      Subjective: \"It feels good to use my hand\"      Objective: See treatment diary below      Assessment: Reports some pain and swelling at sx site after weekend, altered HEP for less repair site. Tolerated session well, ice post.    Plan: Continue per plan of care.      Precautions: UCL Repair     Thumb UCL/RCL Repair   Week 1-2  HFO fabricated with the MP joint in 45 degrees of flexion, IP free  Edema management   Week 3-4  Scar massage   Edema management   AROM begins for the thumb  For UCL repairs, the thumb is actively flexed along the palm of the hand in palmar adduction   Week 5  AAROM for the thumb   Week 6  PROM for the thumb,   For UCL repairs, with the thumb adducted to avoid lateral stress on the repair   Week 7-8  Begin to wean orthotic during light ADLS, for complete discontinuation by week 8    HEP: Thumb, wrist AROM (thumb flexion with adduction); R/G TP /pinch      Manuals           Scar mobs                                                    Neuro Re-Ed             KP  5x/time 5x/time          Extension  Lrg rb x 25 Lrg rb x 25          Jar turn   10x CW.CCW 2#                                                              Ther Ex             Thumb hops                                                                                                        Ther Activity             Pinching YR [place] TT/Key; RCP + gripper G2 RR [place] TT/Key; GCP + gripper G3 RG [place] TT/Key; GCP + grippeG G4          gripping RPW 3x10 Cyl, digicizer R3x10 ind; WF/WE YPB 3x10 RPW 3x10 Cyl; WF/WE RPB 3x10 RPW 3x10 Cyl; WF/WE GPB 3x10                                "                  Modalities             P   5

## 2024-02-21 ENCOUNTER — OFFICE VISIT (OUTPATIENT)
Dept: OCCUPATIONAL THERAPY | Age: 64
End: 2024-02-21
Payer: OTHER MISCELLANEOUS

## 2024-02-21 DIAGNOSIS — Z98.890 S/P MUSCULOSKELETAL SYSTEM SURGERY: ICD-10-CM

## 2024-02-21 DIAGNOSIS — S63.641D RUPTURE OF ULNAR COLLATERAL LIGAMENT OF RIGHT THUMB, SUBSEQUENT ENCOUNTER: Primary | ICD-10-CM

## 2024-02-21 PROCEDURE — 97530 THERAPEUTIC ACTIVITIES: CPT | Performed by: OCCUPATIONAL THERAPIST

## 2024-02-21 PROCEDURE — 97112 NEUROMUSCULAR REEDUCATION: CPT | Performed by: OCCUPATIONAL THERAPIST

## 2024-02-21 NOTE — PROGRESS NOTES
"Daily Note     Today's date: 2024  Patient name: Manju Elizondo  : 1960  MRN: [id number]  Referring provider: Ruth Simpson PA-C  Dx:   Encounter Diagnosis     ICD-10-CM    1. Rupture of ulnar collateral ligament of right thumb, subsequent encounter  S63.641D       2. S/P musculoskeletal system surgery  Z98.890                      Subjective: \"It feels pretty good\"      Objective: See treatment diary below      Assessment: Tolerated upgrades/new PREs without pain      Plan: Continue per plan of care.      Precautions: UCL Repair     Thumb UCL/RCL Repair   Week 1-2  HFO fabricated with the MP joint in 45 degrees of flexion, IP free  Edema management   Week 3-4  Scar massage   Edema management   AROM begins for the thumb  For UCL repairs, the thumb is actively flexed along the palm of the hand in palmar adduction   Week 5  AAROM for the thumb   Week 6  PROM for the thumb,   For UCL repairs, with the thumb adducted to avoid lateral stress on the repair   Week 7-8  Begin to wean orthotic during light ADLS, for complete discontinuation by week 8    HEP: Thumb, wrist AROM (thumb flexion with adduction); R/G TP /pinch      Manuals  216 2 2.21         Scar mobs                                                    Neuro Re-Ed             KP  5x/time 5x/time 5x/time         Extension  Lrg rb x 25 Lrg rb x 25 Lrg rb x 25         Jar turn   10x CW.CCW 2# 10x CW.CCW 2#         Arm bike    6m 3 res         In hand manip    2# 5x 4 planes                                   Ther Ex             Thumb hops                                                                                                        Ther Activity             Pinching YR [place] TT/Key; RCP + gripper G2 RR [place] TT/Key; GCP + gripper G3 RG [place] TT/Key; GCP + grippeG G4 GG [place] TT/Key; GCP + grippeG G5 (held)         gripping RPW 3x10 Cyl, digicizer R3x10 ind; WF/WE YPB 3x10 RPW 3x10 Cyl; WF/WE RPB 3x10 RPW 3x10 Cyl; WF/WE GPB 3x10 " RPW 3x10 Cyl; [facility] GPB 3x10                                                Modalities             P   5

## 2024-02-26 ENCOUNTER — OFFICE VISIT (OUTPATIENT)
Dept: OCCUPATIONAL THERAPY | Age: 64
End: 2024-02-26
Payer: OTHER MISCELLANEOUS

## 2024-02-26 DIAGNOSIS — S63.641D RUPTURE OF ULNAR COLLATERAL LIGAMENT OF RIGHT THUMB, SUBSEQUENT ENCOUNTER: Primary | ICD-10-CM

## 2024-02-26 DIAGNOSIS — Z98.890 S/P MUSCULOSKELETAL SYSTEM SURGERY: ICD-10-CM

## 2024-02-26 PROCEDURE — 97110 THERAPEUTIC EXERCISES: CPT | Performed by: OCCUPATIONAL THERAPIST

## 2024-02-29 ENCOUNTER — OFFICE VISIT (OUTPATIENT)
Dept: OCCUPATIONAL THERAPY | Age: 64
End: 2024-02-29
Payer: OTHER MISCELLANEOUS

## 2024-02-29 DIAGNOSIS — S63.641D RUPTURE OF ULNAR COLLATERAL LIGAMENT OF RIGHT THUMB, SUBSEQUENT ENCOUNTER: Primary | ICD-10-CM

## 2024-02-29 DIAGNOSIS — Z98.890 S/P MUSCULOSKELETAL SYSTEM SURGERY: ICD-10-CM

## 2024-02-29 PROCEDURE — 97112 NEUROMUSCULAR REEDUCATION: CPT | Performed by: OCCUPATIONAL THERAPIST

## 2024-02-29 PROCEDURE — 97530 THERAPEUTIC ACTIVITIES: CPT | Performed by: OCCUPATIONAL THERAPIST

## 2024-02-29 NOTE — PROGRESS NOTES
"Daily Note     Today's date: 2024  Patient name: Manju Elizondo  : 1960  MRN: [id number]  Referring provider: Ruth Simpson PA-C  Dx:   Encounter Diagnosis     ICD-10-CM    1. Rupture of ulnar collateral ligament of right thumb, subsequent encounter  S63.641D       2. S/P musculoskeletal system surgery  Z98.890                      Subjective: \"This is hard\"      Objective: See treatment diary below      Assessment: Fatigued but tolerated well, mild discomfort along dorsal thumb due to tightness      Plan: Continue per plan of care.      Precautions: UCL Repair     Thumb UCL/RCL Repair   Week 1-2  HFO fabricated with the MP joint in 45 degrees of flexion, IP free  Edema management   Week 3-4  Scar massage   Edema management   AROM begins for the thumb  For UCL repairs, the thumb is actively flexed along the palm of the hand in palmar adduction   Week 5  AAROM for the thumb   Week 6  PROM for the thumb,   For UCL repairs, with the thumb adducted to avoid lateral stress on the repair   Week 7-8  Begin to wean orthotic during light ADLS, for complete discontinuation by week 8    HEP: Thumb, wrist AROM (thumb flexion with adduction); R/G TP /pinch      Manuals  2 2 2.21        Scar mobs                                                    Neuro Re-Ed             KP  5x/time 5x/time 5x/time kp        Extension  Lrg rb x 25 Lrg rb x 25 Lrg rb x 25 Y gummy 3x10 Y gummy 3x10       Jar turn   10x CW.CCW 2# 10x CW.CCW 2# 10x CW.CCW 3# 10x CW.CCW 3#       Arm bike    6m 3 res 6m5 res        In hand manip    2# 5x 4 planes 2# 5x 4 planes 2# 5x 4 planes                                 Ther Ex             Thumb hops             Arm bike      6m 5 res                                                                                     Ther Activity             Pinching YR [place] TT/Key; RCP + gripper G2 RR [place] TT/Key; GCP + gripper G3 RG [place] TT/Key; GCP + grippeG G4 GG [place] TT/Key; GCP + " grippeG G5 (held) GG [place] TT/Key; GCP + grippeG G5  GG [place] TT/Key; GCP + grippeG G5        gripping RPW 3x10 Cyl, digicizer R3x10 ind; WF/WE YPB 3x10 RPW 3x10 Cyl; /WE RPB 3x10 RPW 3x10 Cyl; /WE GPB 3x10 RPW 3x10 Cyl; /WE GPB 3x10 GPW 3x10 Cyl; /WE GPB 3x10 GPW 3x10 Cyl; /WE GPB 3x10; sustained G4 3x 30s                                              Modalities             MHP   5

## 2024-03-07 ENCOUNTER — OFFICE VISIT (OUTPATIENT)
Dept: OCCUPATIONAL THERAPY | Age: 64
End: 2024-03-07
Payer: OTHER MISCELLANEOUS

## 2024-03-07 DIAGNOSIS — Z98.890 S/P MUSCULOSKELETAL SYSTEM SURGERY: ICD-10-CM

## 2024-03-07 DIAGNOSIS — S63.641D RUPTURE OF ULNAR COLLATERAL LIGAMENT OF RIGHT THUMB, SUBSEQUENT ENCOUNTER: Primary | ICD-10-CM

## 2024-03-07 PROCEDURE — 97530 THERAPEUTIC ACTIVITIES: CPT | Performed by: OCCUPATIONAL THERAPIST

## 2024-03-07 PROCEDURE — 97110 THERAPEUTIC EXERCISES: CPT | Performed by: OCCUPATIONAL THERAPIST

## 2024-03-07 NOTE — PROGRESS NOTES
"Daily Note     Today's date: 3/7/2024  Patient name: Manju Elizondo  : 1960  MRN: [id number]  Referring provider: Ruth Simpson PA-C  Dx:   Encounter Diagnosis     ICD-10-CM    1. Rupture of ulnar collateral ligament of right thumb, subsequent encounter  S63.641D       2. S/P musculoskeletal system surgery  Z98.890                      Subjective: \"It feels good\"      Objective: See treatment diary below      Assessment: Tolerated well, improving endurance with PREs    Plan: Continue per plan of care.      Precautions: UCL Repair     Thumb UCL/RCL Repair   Week 1-2  HFO fabricated with the MP joint in 45 degrees of flexion, IP free  Edema management   Week 3-4  Scar massage   Edema management   AROM begins for the thumb  For UCL repairs, the thumb is actively flexed along the palm of the hand in palmar adduction   Week 5  AAROM for the thumb   Week 6  PROM for the thumb,   For UCL repairs, with the thumb adducted to avoid lateral stress on the repair   Week 7-8  Begin to wean orthotic during light ADLS, for complete discontinuation by week 8    HEP: Thumb, wrist AROM (thumb flexion with adduction); R/G TP /pinch      Manuals  216 2/19 2.21  3      Scar mobs                                                    Neuro Re-Ed             KP  5x/time 5x/time 5x/time kp  KP      Extension  Lrg rb x 25 Lrg rb x 25 Lrg rb x 25 Y gummy 3x10 Y gummy 3x10 Y gummy 3x10      Jar turn   10x CW.CCW 2# 10x CW.CCW 2# 10x CW.CCW 3# 10x CW.CCW 3# 10x CW.CCW 4#      Arm bike    6m 3 res 6m5 res        In hand manip    2# 5x 4 planes 2# 5x 4 planes 2# 5x 4 planes 2# 5x 4 planes                                Ther Ex             Thumb hops             Arm bike      6m 5 res 6m5 res      Putty stab       1/2 inch iso RTP x 45      Sled       Rope pull 50# 30' x 4                                                          Ther Activity             Pinching YR [place] TT/Key; RCP + gripper G2 RR [place] TT/Key; GCP + " gripper G3 RG [place] TT/Key; GCP + grippeG G4 GG [place] TT/Key; GCP + grippeG G5 (held) GG [place] TT/Key; GCP + grippeG G5  GG [place] TT/Key; GCP + grippeG G5  GBCP [place] TT/Key; GCP + grippeG G5       gripping RPW 3x10 Cyl, digicizer R3x10 ind; [facility] YPB 3x10 RPW 3x10 Cyl; [facility]x10 Cyl; [facility] GPB 3x10 RPW 3x10 Cyl; [facility] GPB 3x10 GPW 3x10 Cyl; [facility] GPB 3x10 GPW 3x10 Cyl; [facility]; sustained G4 3x 30s BPW 3x10 Cyl; / BPB 3x10; sustained G5 3x 30s                                             Modalities             MHP   5

## 2024-03-11 ENCOUNTER — OFFICE VISIT (OUTPATIENT)
Dept: OCCUPATIONAL THERAPY | Age: 64
End: 2024-03-11
Payer: OTHER MISCELLANEOUS

## 2024-03-11 DIAGNOSIS — S63.641D RUPTURE OF ULNAR COLLATERAL LIGAMENT OF RIGHT THUMB, SUBSEQUENT ENCOUNTER: Primary | ICD-10-CM

## 2024-03-11 DIAGNOSIS — Z98.890 S/P MUSCULOSKELETAL SYSTEM SURGERY: ICD-10-CM

## 2024-03-11 PROCEDURE — 97110 THERAPEUTIC EXERCISES: CPT | Performed by: OCCUPATIONAL THERAPIST

## 2024-03-11 NOTE — PROGRESS NOTES
"Daily Note     Today's date: 3/11/2024  Patient name: Manju Elizondo  : 1960  MRN: [id number]  Referring provider: Ruth Simpson PA-C  Dx:   Encounter Diagnosis     ICD-10-CM    1. Rupture of ulnar collateral ligament of right thumb, subsequent encounter  S63.641D       2. S/P musculoskeletal system surgery  Z98.890                      Subjective: \"I can feel my thumb working\"      Objective: See treatment diary below      Assessment: Tolerated well, grossly improved strength with PREs    Plan: Continue per plan of care.      Precautions: UCL Repair     Thumb UCL/RCL Repair   Week 1-2  HFO fabricated with the MP joint in 45 degrees of flexion, IP free  Edema management   Week 3-4  Scar massage   Edema management   AROM begins for the thumb  For UCL repairs, the thumb is actively flexed along the palm of the hand in palmar adduction   Week 5  AAROM for the thumb   Week 6  PROM for the thumb,   For UCL repairs, with the thumb adducted to avoid lateral stress on the repair   Week 7-8  Begin to wean orthotic during light ADLS, for complete discontinuation by week 8    HEP: Thumb, wrist AROM (thumb flexion with adduction); R/G TP /pinch      Manuals  2 2 2.21 2/26 2/29 3/7 3/11     Scar mobs                                                    Neuro Re-Ed             KP  5x/time 5x/time 5x/time kp  KP      Extension  Lrg rb x 25 Lrg rb x 25 Lrg rb x 25 Y gummy 3x10 Y gummy 3x10 Y gummy 3x10 R gummy 3x10     Jar turn   10x CW.CCW 2# 10x CW.CCW 2# 10x CW.CCW 3# 10x CW.CCW 3# 10x CW.CCW 4# 10x CW.CCW 5#     Arm bike    6m 3 res 6m5 res        In hand manip    2# 5x 4 planes 2# 5x 4 planes 2# 5x 4 planes 2# 5x 4 planes 2# 5x 4 planes                               Ther Ex             Thumb hops             Arm bike      6m 5 res 6m5 res 6m5 res     Putty stab       1/2 inch iso RTP x 45 1/2 inch iso RTP x 45     Sled       Rope pull 50# 30' x 4 Rope pull 50# 30' x 4                                        "                  Ther Activity             Pinching YR [place] TT/Key; RCP + gripper G2 RR [place] TT/Key; GCP + gripper G3 RG [place] TT/Key; GCP + grippeG G4 GG [place] TT/Key; GCP + grippeG G5 (held) GG [place] TT/Key; GCP + grippeG G5  GG [place] TT/Key; GCP + grippeG G5  GBCP [place] TT/Key; GCP + grippeG G5  GBCP [place] TT/Key; GCP + grippeG G5      gripping RPW 3x10 Cyl, digicizer R3x10 ind; / YPB 3x10 RPW 3x10 Cyl; [facility] RPB 3x10 RPW 3x10 Cyl; [facility] GPB 3x10 RPW 3x10 Cyl; [facility] GPB 3x10 GPW 3x10 Cyl; [facility] GPB 3x10 GPW 3x10 Cyl; [facility] GPB 3x10; sustained G4 3x 30s BPW 3x10 Cyl; [facility] BPB 3x10; sustained G5 3x 30s BPW 3x10 Cyl; [facility] BPB 3x10; sustained G5 3x 30s                                            Modalities             MHP   5

## 2024-03-13 ENCOUNTER — OFFICE VISIT (OUTPATIENT)
Dept: OCCUPATIONAL THERAPY | Age: 64
End: 2024-03-13
Payer: OTHER MISCELLANEOUS

## 2024-03-13 DIAGNOSIS — Z98.890 S/P MUSCULOSKELETAL SYSTEM SURGERY: Primary | ICD-10-CM

## 2024-03-13 DIAGNOSIS — S63.641D RUPTURE OF ULNAR COLLATERAL LIGAMENT OF RIGHT THUMB, SUBSEQUENT ENCOUNTER: ICD-10-CM

## 2024-03-13 PROCEDURE — 97112 NEUROMUSCULAR REEDUCATION: CPT | Performed by: OCCUPATIONAL THERAPIST

## 2024-03-13 NOTE — PROGRESS NOTES
"Daily Note     Today's date: 3/13/2024  Patient name: Manju Elizondo  : 1960  MRN: [id number]  Referring provider: Ruth Simpson PA-C  Dx:   Encounter Diagnosis     ICD-10-CM    1. S/P musculoskeletal system surgery  Z98.890       2. Rupture of ulnar collateral ligament of right thumb, subsequent encounter  S63.641D                      Subjective: \"It feels strong\"      Objective: See treatment diary below  TT  9  Key 12      3-jaw  9       Grasp 48.7    Assessment: See new /pinch measurements. Great tolerance, no pain      Plan:  DC     Precautions: UCL Repair     Thumb UCL/RCL Repair   Week 1-2  HFO fabricated with the MP joint in 45 degrees of flexion, IP free  Edema management   Week 3-4  Scar massage   Edema management   AROM begins for the thumb  For UCL repairs, the thumb is actively flexed along the palm of the hand in palmar adduction   Week 5  AAROM for the thumb   Week 6  PROM for the thumb,   For UCL repairs, with the thumb adducted to avoid lateral stress on the repair   Week 7-8  Begin to wean orthotic during light ADLS, for complete discontinuation by week 8    HEP: Thumb, wrist AROM (thumb flexion with adduction); R/G TP /pinch      Manuals  2.21 2/26 2/29 3/7 3/11 3/13    Scar mobs                                                    Neuro Re-Ed             KP  5x/time 5x/time 5x/time kp  KP      Extension  Lrg rb x 25 Lrg rb x 25 Lrg rb x 25 Y gummy 3x10 Y gummy 3x10 Y gummy 3x10 R gummy 3x10 R gummy 3x10    Jar turn   10x CW.CCW 2# 10x CW.CCW 2# 10x CW.CCW 3# 10x CW.CCW 3# 10x CW.CCW 4# 10x CW.CCW 5#     Arm bike    6m 3 res 6m5 res        In hand manip    2# 5x 4 planes 2# 5x 4 planes 2# 5x 4 planes 2# 5x 4 planes 2# 5x 4 planes                               Ther Ex             Thumb hops             Arm bike      6m 5 res 6m5 res 6m5 res 6m5 res    Putty stab       1/2 inch iso RTP x 45 1/2 inch iso RTP x 45     Sled       Rope pull 50# 30' x 4 Rope pull 50# 30' x 4 " Rope pull 50# 30' x 4                                                        Ther Activity             Pinching YR [place] TT/Key; RCP + gripper G2 RR [place] TT/Key; GCP + gripper G3 RG [place] TT/Key; GCP + grippeG G4 GG [place] TT/Key; GCP + grippeG G5 (held) GG [place] TT/Key; GCP + grippeG G5  GG [place] TT/Key; GCP + grippeG G5  GBCP [place] TT/Key; GCP + grippeG G5  GBCP [place] TT/Key; GCP + grippeG G5  BBCP [place] TT/Key; GCP + grippeG G5    gripping RPW 3x10 Cyl, digicizer R3x10 ind; [facility] YPB 3x10 RPW 3x10 Cyl; [facility] RPB 3x10 RPW 3x10 Cyl; [facility] GPB 3x10 RPW 3x10 Cyl; [facility] GPB 3x10 GPW 3x10 Cyl; [facility] GPB 3x10 GPW 3x10 Cyl; [facility] GPB 3x10; sustained G4 3x 30s BPW 3x10 Cyl; [facility] BPB 3x10; sustained G5 3x 30s BPW 3x10 Cyl; [facility] BPB 3x10; sustained G5 3x 30s [place] 3x10 Cyl; [facility] BPB 3x10; sustained G5 3x 30s                                           Modalities             MHP   5

## 2024-03-14 ENCOUNTER — OFFICE VISIT (OUTPATIENT)
Dept: OBGYN CLINIC | Facility: CLINIC | Age: 64
End: 2024-03-14

## 2024-03-14 VITALS — HEIGHT: 66 IN | BODY MASS INDEX: 20.89 KG/M2 | WEIGHT: 130 LBS

## 2024-03-14 DIAGNOSIS — Z98.890 S/P MUSCULOSKELETAL SYSTEM SURGERY: ICD-10-CM

## 2024-03-14 DIAGNOSIS — S63.641D RUPTURE OF ULNAR COLLATERAL LIGAMENT OF RIGHT THUMB, SUBSEQUENT ENCOUNTER: Primary | ICD-10-CM

## 2024-03-14 PROCEDURE — 99024 POSTOP FOLLOW-UP VISIT: CPT | Performed by: SURGERY

## 2024-03-14 NOTE — LETTER
March 14, 2024     Patient: Manju Elizondo  YOB: 1960  Date of Visit: 3/14/2024      To Whom it May Concern:    Manju Elizondo is under my professional care. Manju was seen in my office on 3/14/2024. Manju may return to work without restrictions on 3/18/24.    If you have any questions or concerns, please don't hesitate to call.         Sincerely,          Champ Restrepo MD        CC: No Recipients

## 2024-03-14 NOTE — PROGRESS NOTES
Assessment/Plan:  Patient ID: Manju Elizondo 63 y.o. female   Surgery: Repair Ucl Thumb - Right  Date of Surgery: 1/5/2024    Overall Manju is doing great  She may transition to [facility]   Intrinsic atrophy seems to be improving, 5/5 strength on exam  D/c all bracing and resume activity as tolerated  Work note provided to return on Monday     Follow Up:  PRN    To Do Next Visit:         CHIEF COMPLAINT:  Chief Complaint   Patient presents with    Right Thumb - Pain, Follow-up         SUBJECTIVE:  Manju Elizondo is a 63 y.o. year old female who presents for follow up after Repair Ucl Thumb - Right. Today patient has no pain and notes she feels significantly stronger than before. She does have some tightness at the thumb MP with deep flexion but continues to work on it at home.       PHYSICAL EXAMINATION:  General: well developed and well nourished, alert, oriented times 3, and appears comfortable  Psychiatric: Normal    MUSCULOSKELETAL EXAMINATION:  Incision: healed  Surgery Site: normal, no evidence of infection   Range of Motion: opposition intact and full composite fist possible  Neurovascular status: Neuro intact, good cap refill  Activity Restrictions: No restrictions     Improving intrinsic atrophy  5/5 strength on exam    STUDIES REVIEWED:  No Studies to review      PROCEDURES PERFORMED:  Procedures  No Procedures performed today

## 2024-03-31 NOTE — PROGRESS NOTES
"Daily Note     Today's date: 2024  Patient name: Manju Elizondo  : 1960  MRN: [id number]  Referring provider: Ruth Simpson PA-C  Dx:   Encounter Diagnosis     ICD-10-CM    1. Rupture of ulnar collateral ligament of right thumb, subsequent encounter  S63.641D       2. S/P musculoskeletal system surgery  Z98.890                      Subjective: \"It feels fine\"      Objective: See treatment diary below      Assessment: Tolerated well, fatigued    Plan: Continue per plan of care.      Precautions: UCL Repair     Thumb UCL/RCL Repair   Week 1-2  HFO fabricated with the MP joint in 45 degrees of flexion, IP free  Edema management   Week 3-4  Scar massage   Edema management   AROM begins for the thumb  For UCL repairs, the thumb is actively flexed along the palm of the hand in palmar adduction   Week 5  AAROM for the thumb   Week 6  PROM for the thumb,   For UCL repairs, with the thumb adducted to avoid lateral stress on the repair   Week 7-8  Begin to wean orthotic during light ADLS, for complete discontinuation by week 8    HEP: Thumb, wrist AROM (thumb flexion with adduction); R/G TP /pinch      Manuals  216 2 2.21         Scar mobs                                                    Neuro Re-Ed             KP  5x/time 5x/time 5x/time kp        Extension  Lrg rb x 25 Lrg rb x 25 Lrg rb x 25 Y gummy 3x10        Jar turn   10x CW.CCW 2# 10x CW.CCW 2# 10x CW.CCW 3#        Arm bike    6m 3 res 6m5 res        In hand manip    2# 5x 4 planes 2# 5x 4 planes                                  Ther Ex             Thumb hops                                                                                                        Ther Activity             Pinching YR [place] TT/Key; RCP + gripper G2 RR [place] TT/Key; GCP + gripper G3 RG [place] TT/Key; GCP + grippeG G4 GG [place] TT/Key; GCP + grippeG G5 (held) GG [place] TT/Key; GCP + grippeG G5         gripping RPW 3x10 Cyl, digicizer R3x10 ind; WF/WE YPB " 3x10 RPW 3x10 Cyl; /WE RPB 3x10 RPW 3x10 Cyl; /WE GPB 3x10 RPW 3x10 Cyl; /WE GPB 3x10 GPW 3x10 Cyl; /WE GPB 3x10                                               Modalities             MHP   5                                   Cooperative

## 2024-12-10 ENCOUNTER — HOSPITAL ENCOUNTER (OUTPATIENT)
Dept: RADIOLOGY | Age: 64
Discharge: HOME | End: 2024-12-10
Payer: COMMERCIAL

## 2024-12-10 VITALS — WEIGHT: 127 LBS | BODY MASS INDEX: 20.41 KG/M2 | HEIGHT: 66 IN

## 2024-12-10 DIAGNOSIS — Z12.31 ENCOUNTER FOR SCREENING MAMMOGRAM FOR MALIGNANT NEOPLASM OF BREAST: ICD-10-CM

## 2024-12-10 PROCEDURE — 77067 SCR MAMMO BI INCL CAD: CPT

## 2024-12-10 PROCEDURE — 77063 BREAST TOMOSYNTHESIS BI: CPT

## 2025-01-07 ENCOUNTER — HOSPITAL ENCOUNTER (OUTPATIENT)
Dept: RADIOLOGY | Age: 65
Discharge: HOME | End: 2025-01-07
Payer: COMMERCIAL

## 2025-01-07 VITALS — WEIGHT: 130 LBS | BODY MASS INDEX: 21.66 KG/M2 | HEIGHT: 65 IN

## 2025-01-07 DIAGNOSIS — Z13.820 ENCOUNTER FOR SCREENING FOR OSTEOPOROSIS: ICD-10-CM

## 2025-01-07 PROCEDURE — 77080 DXA BONE DENSITY AXIAL: CPT

## (undated) DEVICE — SKN PRP WNG SPNGE PVP SCRB STR: Brand: MEDLINE INDUSTRIES, INC.

## (undated) DEVICE — INTENDED FOR TISSUE SEPARATION, AND OTHER PROCEDURES THAT REQUIRE A SHARP SURGICAL BLADE TO PUNCTURE OR CUT.: Brand: BARD-PARKER ® CARBON RIB-BACK BLADES

## (undated) DEVICE — ARM SLING: Brand: DEROYAL

## (undated) DEVICE — ACE WRAP 4 IN STERILE

## (undated) DEVICE — 3M™ STERI-STRIP™ REINFORCED ADHESIVE SKIN CLOSURES, R1547, 1/2 IN X 4 IN (12 MM X 100 MM), 6 STRIPS/ENVELOPE: Brand: 3M™ STERI-STRIP™

## (undated) DEVICE — DISPOSABLE EQUIPMENT COVER: Brand: SMALL TOWEL DRAPE

## (undated) DEVICE — STERILE BETHLEHEM PLASTIC HAND: Brand: CARDINAL HEALTH

## (undated) DEVICE — GLOVE INDICATOR PI UNDERGLOVE SZ 6.5 BLUE

## (undated) DEVICE — CUFF TOURNIQUET 18 X 4 IN QUICK CONNECT DISP 1 BLADDER

## (undated) DEVICE — DRAPE SHEET THREE QUARTER

## (undated) DEVICE — MINI BLADE ROUND TIP SHARP ON ONE SIDE

## (undated) DEVICE — GLOVE SRG BIOGEL 7

## (undated) DEVICE — GAUZE SPONGES,16 PLY: Brand: CURITY

## (undated) DEVICE — CURITY STRETCH BANDAGE: Brand: CURITY

## (undated) DEVICE — PADDING CAST 4 IN  COTTON STRL

## (undated) DEVICE — GLOVE SRG BIOGEL 6.5

## (undated) DEVICE — GLOVE INDICATOR PI UNDERGLOVE SZ 7 BLUE

## (undated) DEVICE — SUT PROLENE 4-0 PS-2 18 IN 8682G